# Patient Record
Sex: MALE | Race: WHITE | NOT HISPANIC OR LATINO | Employment: OTHER | URBAN - METROPOLITAN AREA
[De-identification: names, ages, dates, MRNs, and addresses within clinical notes are randomized per-mention and may not be internally consistent; named-entity substitution may affect disease eponyms.]

---

## 2020-02-17 ENCOUNTER — TELEPHONE (OUTPATIENT)
Dept: ORTHOPEDICS | Facility: CLINIC | Age: 45
End: 2020-02-17

## 2020-02-17 DIAGNOSIS — M25.521 RIGHT ELBOW PAIN: Primary | ICD-10-CM

## 2020-02-17 NOTE — TELEPHONE ENCOUNTER
Called patient to schedule referred appointment from [facility] with Dr. Culver for treatment of right elbow pain. Appointment made for 02/19/2020 at 8:00 AM at the [facility] and patient verbalized understanding of appointment date, time, and location.

## 2020-02-19 ENCOUNTER — HOSPITAL ENCOUNTER (OUTPATIENT)
Dept: RADIOLOGY | Facility: HOSPITAL | Age: 45
Discharge: HOME | End: 2020-02-19
Attending: ORTHOPAEDIC SURGERY
Payer: MEDICARE

## 2020-02-19 ENCOUNTER — OFFICE VISIT (OUTPATIENT)
Dept: ORTHOPEDICS | Facility: CLINIC | Age: 45
End: 2020-02-19
Payer: MEDICARE

## 2020-02-19 VITALS
DIASTOLIC BLOOD PRESSURE: 112 MMHG | SYSTOLIC BLOOD PRESSURE: 176 MMHG | WEIGHT: 180 LBS | BODY MASS INDEX: 23.1 KG/M2 | HEART RATE: 113 BPM | HEIGHT: 74 IN

## 2020-02-19 DIAGNOSIS — M70.20 OLECRANON BURSITIS: ICD-10-CM

## 2020-02-19 DIAGNOSIS — M25.521 RIGHT ELBOW PAIN: ICD-10-CM

## 2020-02-19 DIAGNOSIS — Z01.818 PRE-OP TESTING: Primary | ICD-10-CM

## 2020-02-19 DIAGNOSIS — Z98.890 HISTORY OF KNEE SURGERY: ICD-10-CM

## 2020-02-19 DIAGNOSIS — M70.21 OLECRANON BURSITIS, RIGHT ELBOW: ICD-10-CM

## 2020-02-19 PROCEDURE — 99205 PR OFFICE/OUTPT VISIT, NEW, LEVL V, 60-74 MIN: ICD-10-PCS | Mod: 25,S$PBB,, | Performed by: ORTHOPAEDIC SURGERY

## 2020-02-19 PROCEDURE — 73080 X-RAY EXAM OF ELBOW: CPT | Mod: TC,PN,RT

## 2020-02-19 PROCEDURE — 73080 X-RAY EXAM OF ELBOW: CPT | Mod: 26,RT,, | Performed by: RADIOLOGY

## 2020-02-19 PROCEDURE — 99213 OFFICE O/P EST LOW 20 MIN: CPT | Mod: PBBFAC,25,PN | Performed by: ORTHOPAEDIC SURGERY

## 2020-02-19 PROCEDURE — 99999 PR PBB SHADOW E&M-EST. PATIENT-LVL III: ICD-10-PCS | Mod: PBBFAC,,, | Performed by: ORTHOPAEDIC SURGERY

## 2020-02-19 PROCEDURE — 99999 PR PBB SHADOW E&M-EST. PATIENT-LVL III: CPT | Mod: PBBFAC,,, | Performed by: ORTHOPAEDIC SURGERY

## 2020-02-19 PROCEDURE — 73080 XR ELBOW COMPLETE 3 VIEW RIGHT: ICD-10-PCS | Mod: 26,RT,, | Performed by: RADIOLOGY

## 2020-02-19 PROCEDURE — 99205 OFFICE O/P NEW HI 60 MIN: CPT | Mod: 25,S$PBB,, | Performed by: ORTHOPAEDIC SURGERY

## 2020-02-19 RX ORDER — MUPIROCIN 20 MG/G
OINTMENT TOPICAL
Status: CANCELLED | OUTPATIENT
Start: 2020-02-19

## 2020-02-19 RX ORDER — SODIUM CHLORIDE 9 MG/ML
INJECTION, SOLUTION INTRAVENOUS CONTINUOUS
Status: CANCELLED | OUTPATIENT
Start: 2020-02-19

## 2020-02-19 NOTE — PROGRESS NOTES
Subjective:      Patient ID: Ayan Marsh is a 44 y.o. male.    Chief Complaint: Injury of the Right Elbow    Referring Provider: Any Self  No address on file    HPI:  Mr. Marsh is a 44-year-old right-hand-dominant male who presented today for evaluation of approximately 7 years of right elbow pain and swelling which began in 2013 after he fell on his shower.  He had an excision of a bursectomy completed in 2014 and his symptoms resolved until approximately 2 weeks ago after he tripped and fell on concrete banging his elbow again. He has persistent pain and swelling since that time and nothing seems to improve it. He has taken NSAIDs without help.  He has not done physical therapy, worn a brace, nor had injections.    Past Medical History:   Diagnosis Date     *  *  *  *  * Fractures right tibia  Hypertension  Depression  Anxiety  GERD  Osteomyelitis right tibia requiring BKA right leg      Past Surgical History:   Procedure Laterality Date    CERVICAL FUSION      HAND SURGERY tendon reconstruction both hands      LEG AMPUTATION right BKA     * BURSECTOMY RIGHT ELBOW       Review of patient's allergies indicates:  No Known Allergies    Social History     Occupational History    Electric shin   Tobacco Use    Smoking status: Current Every Day Smoker   Substance and Sexual Activity    Alcohol use: Yes     Frequency: Monthly or less    Drug use: Yes     Types: Marijuana    Sexual activity: Heterosexual      Family History   Problem Relation Age of Onset    Hypertension Mother     Hypertension Father        Previous Hospitalizations:  Right below-the-knee amputation.    ROS:   Review of Systems   Constitution: Negative for chills and fever.   HENT: Negative for congestion.    Eyes: Negative for blurred vision.   Cardiovascular: Negative for chest pain.   Respiratory: Negative for cough.    Endocrine: Negative for polydipsia.   Hematologic/Lymphatic: Negative for adenopathy.   Skin: Negative  for flushing, itching and skin cancer.   Musculoskeletal: Negative for gout.   Gastrointestinal: Positive for heartburn. Negative for constipation and diarrhea.   Genitourinary: Negative for nocturia.   Neurological: Negative for headaches and seizures.   Psychiatric/Behavioral: Positive for depression. Negative for substance abuse. The patient is nervous/anxious.    Allergic/Immunologic: Negative for environmental allergies.           Objective:      Physical Exam:   General: AAOx3.  No acute distress  HEENT: Normocephalic, PEARLA EOMI, fair dentition  Neck: Supple, No JVD  Chest: Symetric, equal excursion on inspiration  Abdomen: Soft NTND  Vascular:  Pulses intact and equal bilaterally.  Capillary refill less than 3 seconds and equal bilaterally  Neurologic:  Pinprick and soft touch intact and equal bilaterally  Integment:  No ecchymosis, no errythema.  Abrasion olecranon right elbow.  Extremity:  Elbow:  Pronation/supination equal bilaterally 90/85 degrees. Extension/flexion equal bilaterally 0/128 degrees. Radial/ulna stressing equal bilaterally with endpoint.  Nontender over the radial head.  Nontender over the epicondyles bilaterally. Posterior swelling right elbow.  Tender with palpation posterior swelling right elbow.  Posterior swelling solid.  Nontender at the triceps insertion on the olecranon.  Triceps palpable throughout full distribution bilaterally.  Radiography:  Personally reviewed x-rays of the right elbow completed on 02/09/2020 showed posterior olecranon swelling no fracture dislocation.      Assessment:       Impression:  Symptomatic traumatic olecranon bursitis, right elbow.                              Plan:       1.  Discussed physical examination and radiographic findings with the patient. Ayan understands that he has a traumatic olecranon bursitis of his right elbow and his treatment alternatives could be observation, aspiration, colder warm compresses, padding/bracing, NSAIDs, or  surgical excision.  He stated that since he got a good result with surgical excision in the past he would prefer to have the bursitis removed and proceed with surgery. He understands that since he has an abrasion over his elbow he cannot immediately proceed with surgery and he must wait approximately 1 week.  2.  Possible complications of surgery and alternatives to surgery were discussed with the patient, possible complications include bleeding, infection, scarring, nerve/blood vessel/tendon damage, need for further surgery, failed surgery, failure to improve, possible persistent pain, possible recurrence, possible skin slough, and possible amputation were discussed with the patient.  The patient was permitted to ask questions and all concerns were addressed to his satisfaction.  3.  Consent for olecranon bursectomy, right elbow.  4.  All postoperative meds will be prescribed on the date of surgery.  5.  Recommend the patient purchase elbow pads and wear them.  6.  Home exercises to include avoidance of leaning on elbows was shown discussed with the patient.  7.  Apply heating pad to elbows.  8.  Tentatively schedule surgery for 02/27/2020.  9.  Preop labs were ordered to include CBC, metabolic panel, and PT/INR.  10.  [facility] portal was discussed with the patient and information was given.  The patient was encouraged to use the portal for future encounters.  11.  Follow up approximately 10-12 days postop.

## 2020-02-19 NOTE — H&P
Chief Complaint: Injury of the Right Elbow    HPI:  Mr. Marsh is a 44-year-old right-hand-dominant male who presented today for evaluation of approximately 7 years of right elbow pain and swelling which began in 2013 after he fell on his shower.  He had an excision of a bursectomy completed in 2014 and his symptoms resolved until approximately 2 weeks ago after he tripped and fell on concrete banging his elbow again. He has persistent pain and swelling since that time and nothing seems to improve it. He has taken NSAIDs without help.  He has not done physical therapy, worn a brace, nor had injections.    Past Medical History:   Diagnosis Date     *  *  *  *  * Fractures right tibia  Hypertension  Depression  Anxiety  GERD  Osteomyelitis right tibia requiring BKA right leg      Past Surgical History:   Procedure Laterality Date    CERVICAL FUSION      HAND SURGERY tendon reconstruction both hands      LEG AMPUTATION right BKA     * BURSECTOMY RIGHT ELBOW       Review of patient's allergies indicates:  No Known Allergies    Social History     Occupational History    Electric shin   Tobacco Use    Smoking status: Current Every Day Smoker   Substance and Sexual Activity    Alcohol use: Yes     Frequency: Monthly or less    Drug use: Yes     Types: Marijuana    Sexual activity: Heterosexual      Family History   Problem Relation Age of Onset    Hypertension Mother     Hypertension Father        Previous Hospitalizations:  Right below-the-knee amputation.    ROS:   Review of Systems   Constitution: Negative for chills and fever.   HENT: Negative for congestion.    Eyes: Negative for blurred vision.   Cardiovascular: Negative for chest pain.   Respiratory: Negative for cough.    Endocrine: Negative for polydipsia.   Hematologic/Lymphatic: Negative for adenopathy.   Skin: Negative for flushing, itching and skin cancer.   Musculoskeletal: Negative for gout.   Gastrointestinal: Positive for heartburn. Negative  for constipation and diarrhea.   Genitourinary: Negative for nocturia.   Neurological: Negative for headaches and seizures.   Psychiatric/Behavioral: Positive for depression. Negative for substance abuse. The patient is nervous/anxious.    Allergic/Immunologic: Negative for environmental allergies.           Objective:      Physical Exam:   General: AAOx3.  No acute distress  HEENT: Normocephalic, PEARLA EOMI, fair dentition  Neck: Supple, No JVD  Chest: Symetric, equal excursion on inspiration  Abdomen: Soft NTND  Vascular:  Pulses intact and equal bilaterally.  Capillary refill less than 3 seconds and equal bilaterally  Neurologic:  Pinprick and soft touch intact and equal bilaterally  Integment:  No ecchymosis, no errythema.  Abrasion olecranon right elbow.  Extremity:  Elbow:  Pronation/supination equal bilaterally 90/85 degrees. Extension/flexion equal bilaterally 0/128 degrees. Radial/ulna stressing equal bilaterally with endpoint.  Nontender over the radial head.  Nontender over the epicondyles bilaterally. Posterior swelling right elbow.  Tender with palpation posterior swelling right elbow.  Posterior swelling solid.  Nontender at the triceps insertion on the olecranon.  Triceps palpable throughout full distribution bilaterally.  Radiography:  Personally reviewed x-rays of the right elbow completed on 02/09/2020 showed posterior olecranon swelling no fracture dislocation.      Assessment:       Impression:  Symptomatic traumatic olecranon bursitis, right elbow.                              Plan:       1.  Discussed physical examination and radiographic findings with the patient. Ayan understands that he has a traumatic olecranon bursitis of his right elbow and his treatment alternatives could be observation, aspiration, colder warm compresses, padding/bracing, NSAIDs, or surgical excision.  He stated that since he got a good result with surgical excision in the past he would prefer to have the bursitis  removed and proceed with surgery. He understands that since he has an abrasion over his elbow he cannot immediately proceed with surgery and he must wait approximately 1 week.  2.  Possible complications of surgery and alternatives to surgery were discussed with the patient, possible complications include bleeding, infection, scarring, nerve/blood vessel/tendon damage, need for further surgery, failed surgery, failure to improve, possible persistent pain, possible recurrence, possible skin slough, and possible amputation were discussed with the patient.  The patient was permitted to ask questions and all concerns were addressed to his satisfaction.  3.  Consent for olecranon bursectomy, right elbow.  4.  All postoperative meds will be prescribed on the date of surgery.  5.  Recommend the patient purchase elbow pads and wear them.  6.  Home exercises to include avoidance of leaning on elbows was shown discussed with the patient.  7.  Apply heating pad to elbows.  8.  [facility] portal was discussed with the patient and information was given.  The patient was encouraged to use the portal for future encounters.  9.  Follow up approximately 10-12 days postop.  10.  Tentatively schedule surgery for 02/27/2020.

## 2020-02-26 ENCOUNTER — TELEPHONE (OUTPATIENT)
Dept: ORTHOPEDICS | Facility: CLINIC | Age: 45
End: 2020-02-26

## 2020-02-26 NOTE — TELEPHONE ENCOUNTER
Called patient to ask about his surgical clearance. No answer at patient's phone number [phone] and no voicemail setup.

## 2020-02-26 NOTE — TELEPHONE ENCOUNTER
Awaiting patient's surgical clearance from his primary care provider.     Called patient's phone number [phone]. Phone number does not belong to patient. I was informed by a woman at this number that she knows about the procedure and does not think he will be having it completed. I was given patient's phone number [phone] with no answer and no voicemail setup.

## 2020-03-05 ENCOUNTER — TELEPHONE (OUTPATIENT)
Dept: ORTHOPEDICS | Facility: CLINIC | Age: 45
End: 2020-03-05

## 2020-03-05 NOTE — TELEPHONE ENCOUNTER
----- Message from Sonali Ng sent at 3/5/2020 12:12 PM CST -----  Type: Calling concerning referral sent    Who Called:  Patient  Best Call Back Number: [phone]  Additional Information: [facility] calling concerning referral sent for patient/please call back to advise.

## 2020-03-05 NOTE — TELEPHONE ENCOUNTER
Returned call the [facility] and verified that referral was completed and patient was already seen.

## 2021-01-10 ENCOUNTER — HOSPITAL ENCOUNTER (EMERGENCY)
Facility: HOSPITAL | Age: 46
Discharge: HOME | End: 2021-01-10
Attending: EMERGENCY MEDICINE
Payer: MEDICARE

## 2021-01-10 VITALS
BODY MASS INDEX: 23.11 KG/M2 | RESPIRATION RATE: 18 BRPM | HEART RATE: 88 BPM | OXYGEN SATURATION: 97 % | DIASTOLIC BLOOD PRESSURE: 88 MMHG | TEMPERATURE: 98 F | WEIGHT: 180 LBS | SYSTOLIC BLOOD PRESSURE: 128 MMHG

## 2021-01-10 DIAGNOSIS — F32.A MILD EPISODE OF DEPRESSION: ICD-10-CM

## 2021-01-10 DIAGNOSIS — F15.91 HISTORY OF METHAMPHETAMINE USE: Primary | ICD-10-CM

## 2021-01-10 PROCEDURE — 99283 EMERGENCY DEPT VISIT LOW MDM: CPT

## 2021-01-10 PROCEDURE — 25000003 PHARM REV CODE 250: Performed by: EMERGENCY MEDICINE

## 2021-01-10 RX ORDER — ACETAMINOPHEN 325 MG/1
650 TABLET ORAL
Status: COMPLETED | OUTPATIENT
Start: 2021-01-10 | End: 2021-01-10

## 2021-01-10 RX ORDER — IBUPROFEN 400 MG/1
400 TABLET ORAL
Status: COMPLETED | OUTPATIENT
Start: 2021-01-10 | End: 2021-01-10

## 2021-01-10 RX ADMIN — IBUPROFEN 400 MG: 400 TABLET, FILM COATED ORAL at 10:01

## 2021-01-10 RX ADMIN — ACETAMINOPHEN 650 MG: 325 TABLET ORAL at 10:01

## 2022-06-01 ENCOUNTER — HOSPITAL ENCOUNTER (EMERGENCY)
Facility: HOSPITAL | Age: 47
Discharge: HOME | End: 2022-06-01
Attending: FAMILY MEDICINE
Payer: MEDICARE

## 2022-06-01 VITALS
DIASTOLIC BLOOD PRESSURE: 111 MMHG | BODY MASS INDEX: 25.03 KG/M2 | HEART RATE: 108 BPM | TEMPERATURE: 98 F | RESPIRATION RATE: 20 BRPM | SYSTOLIC BLOOD PRESSURE: 142 MMHG | WEIGHT: 195 LBS | HEIGHT: 74 IN | OXYGEN SATURATION: 97 %

## 2022-06-01 DIAGNOSIS — S01.81XA FACIAL LACERATION, INITIAL ENCOUNTER: Primary | ICD-10-CM
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
## 2022-06-01 PROCEDURE — 12011 RPR F/E/E/N/L/M 2.5 CM/<: CPT

## 2022-06-01 PROCEDURE — 12013 RPR F/E/E/N/L/M 2.6-5.0 CM: CPT

## 2022-06-01 PROCEDURE — 70486 CT MAXILLOFACIAL WITHOUT CONTRAST: ICD-10-PCS | Mod: 26,,, | Performed by: RADIOLOGY

## 2022-06-01 PROCEDURE — 99285 EMERGENCY DEPT VISIT HI MDM: CPT | Mod: 25

## 2022-06-01 PROCEDURE — 40650 RPR LIP FTH VERMILION ONLY: CPT

## 2022-06-01 PROCEDURE — 90714 TD VACC NO PRESV 7 YRS+ IM: CPT | Performed by: FAMILY MEDICINE

## 2022-06-01 PROCEDURE — 70486 CT MAXILLOFACIAL W/O DYE: CPT | Mod: TC

## 2022-06-01 PROCEDURE — 70486 CT MAXILLOFACIAL W/O DYE: CPT | Mod: 26,,, | Performed by: RADIOLOGY

## 2022-06-01 PROCEDURE — 63600175 PHARM REV CODE 636 W HCPCS: Performed by: FAMILY MEDICINE

## 2022-06-01 PROCEDURE — 70450 CT HEAD WITHOUT CONTRAST: ICD-10-PCS | Mod: 26,,, | Performed by: RADIOLOGY

## 2022-06-01 PROCEDURE — 70450 CT HEAD/BRAIN W/O DYE: CPT | Mod: 26,,, | Performed by: RADIOLOGY

## 2022-06-01 PROCEDURE — 70450 CT HEAD/BRAIN W/O DYE: CPT | Mod: TC

## 2022-06-01 PROCEDURE — 90471 IMMUNIZATION ADMIN: CPT | Performed by: FAMILY MEDICINE

## 2022-06-01 PROCEDURE — 25000003 PHARM REV CODE 250: Performed by: FAMILY MEDICINE

## 2022-06-01 RX ORDER — LIDOCAINE HYDROCHLORIDE 10 MG/ML
1 INJECTION INFILTRATION; PERINEURAL
Status: DISCONTINUED | OUTPATIENT
Start: 2022-06-01 | End: 2022-06-01

## 2022-06-01 RX ORDER — KETOROLAC TROMETHAMINE 10 MG/1
10 TABLET, FILM COATED ORAL EVERY 6 HOURS
Qty: 20 TABLET | Refills: 0 | Status: SHIPPED | OUTPATIENT
Start: 2022-06-01 | End: 2022-06-06

## 2022-06-01 RX ORDER — LIDOCAINE HYDROCHLORIDE 10 MG/ML
5 INJECTION, SOLUTION EPIDURAL; INFILTRATION; INTRACAUDAL; PERINEURAL
Status: COMPLETED | OUTPATIENT
Start: 2022-06-01 | End: 2022-06-01

## 2022-06-01 RX ADMIN — LIDOCAINE HYDROCHLORIDE 50 MG: 10 INJECTION, SOLUTION EPIDURAL; INFILTRATION; INTRACAUDAL; PERINEURAL at 04:06

## 2022-06-01 RX ADMIN — TETANUS AND DIPHTHERIA TOXOIDS ADSORBED 0.5 ML: 2; 2 INJECTION INTRAMUSCULAR at 05:06
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
## 2022-06-01 NOTE — DISCHARGE INSTRUCTIONS
Sutures out in 7 days there are 6 sutures on the eyebrow for on the lip 1 of which is absorbable and 3 on the right buccal area

## 2022-09-23 ENCOUNTER — OFFICE VISIT (OUTPATIENT)
Dept: PRIMARY CARE CLINIC | Facility: CLINIC | Age: 47
End: 2022-09-23
Payer: MEDICARE

## 2022-09-23 VITALS
DIASTOLIC BLOOD PRESSURE: 96 MMHG | BODY MASS INDEX: 23.49 KG/M2 | HEART RATE: 80 BPM | TEMPERATURE: 98 F | RESPIRATION RATE: 18 BRPM | HEIGHT: 74 IN | WEIGHT: 183 LBS | SYSTOLIC BLOOD PRESSURE: 140 MMHG | OXYGEN SATURATION: 96 %

## 2022-09-23 DIAGNOSIS — M70.21 OLECRANON BURSITIS OF RIGHT ELBOW: ICD-10-CM

## 2022-09-23 DIAGNOSIS — Z00.00 ENCOUNTER FOR MEDICAL EXAMINATION TO ESTABLISH CARE: Primary | ICD-10-CM

## 2022-09-23 DIAGNOSIS — L03.115 CELLULITIS OF RIGHT LEG WITHOUT FOOT: ICD-10-CM

## 2022-09-23 DIAGNOSIS — Z13.1 ENCOUNTER FOR SCREENING FOR DIABETES MELLITUS: ICD-10-CM

## 2022-09-23 DIAGNOSIS — Z89.511 HX OF BKA, RIGHT: ICD-10-CM

## 2022-09-23 DIAGNOSIS — I10 HYPERTENSION, ESSENTIAL: ICD-10-CM

## 2022-09-23 DIAGNOSIS — F10.29 ALCOHOL DEPENDENCE WITH UNSPECIFIED ALCOHOL-INDUCED DISORDER: ICD-10-CM

## 2022-09-23 DIAGNOSIS — F17.210 TOBACCO DEPENDENCE DUE TO CIGARETTES: ICD-10-CM

## 2022-09-23 LAB
ALBUMIN SERPL BCP-MCNC: 3.6 G/DL (ref 3.5–5.2)
ALP SERPL-CCNC: 81 U/L (ref 55–135)
ALT SERPL W/O P-5'-P-CCNC: 36 U/L (ref 10–44)
ANION GAP SERPL CALC-SCNC: 11 MMOL/L (ref 8–16)
AST SERPL-CCNC: 40 U/L (ref 10–40)
BASOPHILS # BLD AUTO: 0.07 K/UL (ref 0–0.2)
BASOPHILS NFR BLD: 0.8 % (ref 0–1.9)
BILIRUB SERPL-MCNC: 0.5 MG/DL (ref 0.1–1)
BUN SERPL-MCNC: 11 MG/DL (ref 6–20)
CALCIUM SERPL-MCNC: 8.7 MG/DL (ref 8.7–10.5)
CHLORIDE SERPL-SCNC: 105 MMOL/L (ref 95–110)
CHOLEST SERPL-MCNC: 194 MG/DL (ref 120–199)
CHOLEST/HDLC SERPL: 2.6 {RATIO} (ref 2–5)
CO2 SERPL-SCNC: 23 MMOL/L (ref 23–29)
CREAT SERPL-MCNC: 0.8 MG/DL (ref 0.5–1.4)
DIFFERENTIAL METHOD: ABNORMAL
EOSINOPHIL # BLD AUTO: 0.3 K/UL (ref 0–0.5)
EOSINOPHIL NFR BLD: 3.9 % (ref 0–8)
ERYTHROCYTE [DISTWIDTH] IN BLOOD BY AUTOMATED COUNT: 15.4 % (ref 11.5–14.5)
EST. GFR  (NO RACE VARIABLE): >60 ML/MIN/1.73 M^2
ESTIMATED AVG GLUCOSE: 100 MG/DL (ref 68–131)
GLUCOSE SERPL-MCNC: 99 MG/DL (ref 70–110)
HBA1C MFR BLD: 5.1 % (ref 4–5.6)
HCT VFR BLD AUTO: 45.7 % (ref 40–54)
HCV AB SERPL QL IA: NORMAL
HDLC SERPL-MCNC: 75 MG/DL (ref 40–75)
HDLC SERPL: 38.7 % (ref 20–50)
HGB BLD-MCNC: 15.5 G/DL (ref 14–18)
HIV 1+2 AB+HIV1 P24 AG SERPL QL IA: NORMAL
IMM GRANULOCYTES # BLD AUTO: 0.08 K/UL (ref 0–0.04)
IMM GRANULOCYTES NFR BLD AUTO: 0.9 % (ref 0–0.5)
LDLC SERPL CALC-MCNC: 100.2 MG/DL (ref 63–159)
LYMPHOCYTES # BLD AUTO: 2 K/UL (ref 1–4.8)
LYMPHOCYTES NFR BLD: 23.4 % (ref 18–48)
MCH RBC QN AUTO: 30.6 PG (ref 27–31)
MCHC RBC AUTO-ENTMCNC: 33.9 G/DL (ref 32–36)
MCV RBC AUTO: 90 FL (ref 82–98)
MONOCYTES # BLD AUTO: 0.7 K/UL (ref 0.3–1)
MONOCYTES NFR BLD: 8.6 % (ref 4–15)
NEUTROPHILS # BLD AUTO: 5.4 K/UL (ref 1.8–7.7)
NEUTROPHILS NFR BLD: 62.4 % (ref 38–73)
NONHDLC SERPL-MCNC: 119 MG/DL
NRBC BLD-RTO: 0 /100 WBC
PLATELET # BLD AUTO: 199 K/UL (ref 150–450)
PMV BLD AUTO: 11.1 FL (ref 9.2–12.9)
POTASSIUM SERPL-SCNC: 4.3 MMOL/L (ref 3.5–5.1)
PROT SERPL-MCNC: 7 G/DL (ref 6–8.4)
RBC # BLD AUTO: 5.07 M/UL (ref 4.6–6.2)
SODIUM SERPL-SCNC: 139 MMOL/L (ref 136–145)
TRIGL SERPL-MCNC: 94 MG/DL (ref 30–150)
WBC # BLD AUTO: 8.62 K/UL (ref 3.9–12.7)

## 2022-09-23 PROCEDURE — 99205 OFFICE O/P NEW HI 60 MIN: CPT | Mod: S$GLB,,, | Performed by: FAMILY MEDICINE

## 2022-09-23 PROCEDURE — 99205 PR OFFICE/OUTPT VISIT, NEW, LEVL V, 60-74 MIN: ICD-10-PCS | Mod: S$GLB,,, | Performed by: FAMILY MEDICINE

## 2022-09-23 PROCEDURE — 85025 COMPLETE CBC W/AUTO DIFF WBC: CPT | Performed by: FAMILY MEDICINE

## 2022-09-23 PROCEDURE — 83036 HEMOGLOBIN GLYCOSYLATED A1C: CPT | Performed by: FAMILY MEDICINE

## 2022-09-23 PROCEDURE — 87389 HIV-1 AG W/HIV-1&-2 AB AG IA: CPT | Performed by: FAMILY MEDICINE

## 2022-09-23 PROCEDURE — 80061 LIPID PANEL: CPT | Performed by: FAMILY MEDICINE

## 2022-09-23 PROCEDURE — 80053 COMPREHEN METABOLIC PANEL: CPT | Performed by: FAMILY MEDICINE

## 2022-09-23 PROCEDURE — 86803 HEPATITIS C AB TEST: CPT | Performed by: FAMILY MEDICINE

## 2022-09-23 RX ORDER — CEPHALEXIN 500 MG/1
500 CAPSULE ORAL EVERY 12 HOURS
Qty: 14 CAPSULE | Refills: 0 | Status: SHIPPED | OUTPATIENT
Start: 2022-09-23 | End: 2022-09-30
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
## 2022-09-23 NOTE — ASSESSMENT & PLAN NOTE
- discussed extensively smoking cessation today, patient states he is not really interested in smoking cessation however,  is agreeable to be referred to smoking cessation program today

## 2022-09-23 NOTE — PROGRESS NOTES
Subjective:       Patient ID: Ayan Marsh is a 47 y.o. male.    Chief Complaint: Diabetes (New Patient / need a PCP )    47-year-old male presents to clinic to establish care.  States he has a past medical history of depression and hypertension for which he no longer takes medication.  Patient states he had a right below-knee amputation approximately March of 2011 after breaking his leg and it becoming infected.  Patient also states he underwent ACDF at [facility] in 1999.  States he also had surgery on his left arm and his right arm twice due to broken glass.  Patient currently uses [facility] for his prosthesis.  Patient states he has a family history of hyperlipidemia, hypertension, lung cancer, diabetes.  The patient states he smokes approximately 1 pack per day and has been doing so since age of 17 years old, states he also drinks a 6 pack of beer a day and has been doing so for 16 years, states he occasionally smokes marijuana but denies any other illicit drug use, currently works as .    Today, patient is wanting to establish care.  Complains of and nodule on his right elbow, states he would like to have a referral for this area.  States that he previously had surgery on the area due to it being filled with fluid, states there is no fluid in it today.  Patient is also inquiring about a prescription for gabapentin and pain medication.  Patient also states that he was recently seen for disability physical exam, and the physician there was worried due to redness at the site of his BKA.  Discussed with patient I do not prescribe any controlled substances.  States he has been taking gabapentin from his mother.        Discussed care gaps with patient today I will send Gwyn to his home, states he received COVID Peña and Peña vaccine at [facility].  Declined all other vaccinations today.        Current Outpatient Medications:     cephALEXin (KEFLEX) 500 MG capsule, Take 1 capsule (500  "mg total) by mouth every 12 (twelve) hours. Take with food for 7 days, Disp: 14 capsule, Rfl: 0    Review of patient's allergies indicates:   Allergen Reactions    Opioids - morphine analogues        Past Medical History:   Diagnosis Date    Fractures     Hypertension        Past Surgical History:   Procedure Laterality Date    CERVICAL FUSION      HAND SURGERY      LEG AMPUTATION      Right    LEG SURGERY      NECK SURGERY         Family History   Problem Relation Age of Onset    Hypertension Mother     Hypertension Father        Social History     Tobacco Use    Smoking status: Every Day     Packs/day: 1.00     Types: Cigarettes     Passive exposure: Current    Smokeless tobacco: Never   Substance Use Topics    Alcohol use: Yes     Alcohol/week: 7.0 standard drinks     Types: 7 Cans of beer per week    Drug use: Yes     Types: Marijuana, Methamphetamines     Comment: last use 7th of january       Review of Systems   Constitutional:  Negative for activity change, appetite change, fatigue, fever and unexpected weight change.   HENT:  Negative for ear discharge, ear pain, hearing loss and tinnitus.    Eyes:  Negative for photophobia, pain and visual disturbance.   Respiratory:  Negative for cough, shortness of breath and wheezing.    Cardiovascular:  Negative for chest pain and palpitations.   Gastrointestinal:  Negative for abdominal pain, blood in stool, constipation, diarrhea, nausea and vomiting.   Genitourinary:  Negative for dysuria and hematuria.   Musculoskeletal:         Nodule on right elbow, and redness at site of BKA   Neurological:  Negative for weakness and headaches.       Current Medications:   Home Psychiatric Meds:   Psychotherapeutics (From admission, onward)      None                Objective:      Vitals:    09/23/22 1123 09/23/22 1208   BP: (!) 142/86 (!) 140/96   Pulse: 80    Resp: 18    Temp: 98.1 °F (36.7 °C)    TempSrc: Oral    SpO2: 96%    Weight: 83 kg (183 lb)    Height: 6' 2" (1.88 m)  "     Physical Exam  Vitals reviewed.   Constitutional:       Appearance: Normal appearance.   HENT:      Head: Normocephalic.      Right Ear: Tympanic membrane, ear canal and external ear normal.      Left Ear: Tympanic membrane, ear canal and external ear normal.      Nose: Nose normal.      Mouth/Throat:      Mouth: Mucous membranes are moist.   Eyes:      Pupils: Pupils are equal, round, and reactive to light.   Cardiovascular:      Rate and Rhythm: Normal rate and regular rhythm.      Pulses: Normal pulses.      Heart sounds: Normal heart sounds.   Pulmonary:      Effort: Pulmonary effort is normal.      Breath sounds: Normal breath sounds.   Abdominal:      General: Bowel sounds are normal.      Palpations: Abdomen is soft.   Musculoskeletal:      Cervical back: Normal range of motion and neck supple.      Right lower leg: Deformity present. No swelling or bony tenderness.        Legs:       Comments: Erythema noted at the site of BKA along with small nodule no tenderness to palpation      Right Lower Extremity: Right leg is amputated below knee.   Skin:     General: Skin is warm and dry.   Neurological:      General: No focal deficit present.      Mental Status: He is alert and oriented to person, place, and time.   Psychiatric:         Mood and Affect: Mood normal.         Behavior: Behavior normal.         Lab Results   Component Value Date    WBC 8.8 07/08/2022    HGB 14.5 07/08/2022    HCT 44.4 07/08/2022     (L) 07/08/2022     (H) 07/08/2022     (H) 07/08/2022     07/08/2022    K 3.1 (L) 07/08/2022     07/08/2022    CREATININE 0.72 07/08/2022    BUN 4 (L) 07/08/2022    CO2 31 07/08/2022    INR 1.00 07/08/2022      Assessment:       1. Encounter for medical examination to establish care    2. Olecranon bursitis of right elbow    3. Hypertension, essential    4. Encounter for screening for diabetes mellitus    5. Alcohol dependence with unspecified alcohol-induced disorder     6. Cellulitis of right leg without foot    7. Hx of BKA, right    8. Tobacco dependence due to cigarettes          Plan:         Problem List Items Addressed This Visit          Psychiatric    Alcohol dependence with unspecified alcohol-induced disorder     - and discussed with patient to bring his alcohol intake to aid in his overall health and well being, patient does not seem interested in cessation at this time  - encouraged patient to monitor himself for symptoms if he does stop drinking, patient states he has never had DTs in the past when he was in intermediate            Cardiac/Vascular    Hypertension, essential     - return for blood pressure check in 2 weeks  - advised patient to keep blood pressure log at home and bring to next appointment  - consider implementation of medication at this time the 14, patient was previously on lisinopril  - advised patient to taper his alcohol intake as this is also contributing to his elevated blood pressure readings         Relevant Orders    CBC Auto Differential    Comprehensive Metabolic Panel    Lipid Panel       ID    Cellulitis of right leg without foot     - will treat with antibiotics today, patient to notify our office if symptoms worsen  - will re-evaluate when patient returns for blood pressure check         Relevant Medications    cephALEXin (KEFLEX) 500 MG capsule    Other Relevant Orders    Hemoglobin A1C       Endocrine    Encounter for screening for diabetes mellitus    Relevant Orders    Hemoglobin A1C       Orthopedic    Olecranon bursitis of right elbow     - will refer patient to Orthopedics today for further evaluation and treatment         Relevant Orders    Ambulatory referral/consult to Orthopedics    Hx of BKA, right       Other    Encounter for medical examination to establish care - Primary     - will notify patient of lab results by a portal, patient should follow up in 1 month to discuss results         Relevant Orders    CBC Auto Differential     Comprehensive Metabolic Panel    Lipid Panel    HIV 1/2 Ag/Ab (4th Gen)    Hepatitis C Antibody    Cologuard Screening (Multitarget Stool DNA)    Tobacco dependence due to cigarettes     - discussed extensively smoking cessation today, patient states he is not really interested in smoking cessation however,  is agreeable to be referred to smoking cessation program today         Relevant Orders    Ambulatory referral/consult to Smoking Cessation Program         Follow up in about 1 month (around 10/23/2022), or if symptoms worsen or fail to improve.  Will notify patient of lab results via portal.      Approximately 60 minutes were spent on this encounter. This includes face to face time and non-face to face time preparing to see the patient (eg, review of tests), obtaining and/or reviewing separately obtained history, documenting clinical information in the electronic or other health record, independently interpreting results and communicating results to the patient/family/caregiver, or care coordinator.    Instructed patient that if symptoms fail to improve or worsen patient should seek immediate medical attention or report to the nearest emergency department. Patient expressed verbal agreement and understanding to this plan of care.     ANDRY Stringer MD

## 2022-09-23 NOTE — ASSESSMENT & PLAN NOTE
- will notify patient of lab results by a portal, patient should follow up in 1 month to discuss results

## 2022-09-23 NOTE — ASSESSMENT & PLAN NOTE
- return for blood pressure check in 2 weeks  - advised patient to keep blood pressure log at home and bring to next appointment  - consider implementation of medication at this time the 14, patient was previously on lisinopril  - advised patient to taper his alcohol intake as this is also contributing to his elevated blood pressure readings

## 2022-09-23 NOTE — ASSESSMENT & PLAN NOTE
- will treat with antibiotics today, patient to notify our office if symptoms worsen  - will re-evaluate when patient returns for blood pressure check

## 2022-09-30 ENCOUNTER — TELEPHONE (OUTPATIENT)
Dept: FAMILY MEDICINE | Facility: CLINIC | Age: 47
End: 2022-09-30
Payer: MEDICARE

## 2022-09-30 NOTE — TELEPHONE ENCOUNTER
Call placed to [facility] due to message left. Spoke with representative states the wrong ICD code was put in for schedule for the Colorguard testing. Requesting a code for colon screening.  ----- Message from Su Palacios sent at 9/30/2022  9:04 AM CDT -----  Contact: Bridget  Type: Needs Medical Advice    Who Called: [email or web address]  Best Call Back Number: [phone]  Additional  Information: calling to let office know the cologuard kit has not been yet shipped to pt. Due to ICD 10 code is not correct   Please Advise- Thank you

## 2022-10-03 DIAGNOSIS — Z12.11 SCREENING FOR COLON CANCER: Primary | ICD-10-CM

## 2022-10-03 NOTE — TELEPHONE ENCOUNTER
Second call place to [employer] spoke with Hal given the correct ICD code and will be able to send the kit and will send a confirmation # to Dr. Kothari for shipping the kit. Correct code Z12.11

## 2022-12-26 PROBLEM — Z13.1 ENCOUNTER FOR SCREENING FOR DIABETES MELLITUS: Status: RESOLVED | Noted: 2022-09-23 | Resolved: 2022-12-26

## 2022-12-26 PROBLEM — Z00.00 ENCOUNTER FOR MEDICAL EXAMINATION TO ESTABLISH CARE: Status: RESOLVED | Noted: 2022-09-23 | Resolved: 2022-12-26

## 2023-02-08 ENCOUNTER — TELEPHONE (OUTPATIENT)
Dept: PRIMARY CARE CLINIC | Facility: CLINIC | Age: 48
End: 2023-02-08
Payer: MEDICARE

## 2023-02-08 NOTE — TELEPHONE ENCOUNTER
----- Message from Olga Jama sent at 2/7/2023 12:19 PM CST -----  Contact: called at [phone]  Type: Needs Medical Advice  Who Called:  Pt  Best Call Back Number: [phone]  Additional Information: Pt is calling to see if he can be seen on 02/10/2023. Pt already has an appt on 02/09/2023 but will be in the area on 02/10/2023 for another appt. Please call back and advise.

## 2023-02-09 ENCOUNTER — OFFICE VISIT (OUTPATIENT)
Dept: PRIMARY CARE CLINIC | Facility: CLINIC | Age: 48
End: 2023-02-09
Payer: MEDICARE

## 2023-02-09 VITALS
DIASTOLIC BLOOD PRESSURE: 88 MMHG | OXYGEN SATURATION: 96 % | HEART RATE: 86 BPM | TEMPERATURE: 98 F | BODY MASS INDEX: 24 KG/M2 | WEIGHT: 187 LBS | SYSTOLIC BLOOD PRESSURE: 138 MMHG | RESPIRATION RATE: 18 BRPM | HEIGHT: 74 IN

## 2023-02-09 DIAGNOSIS — E78.2 MIXED HYPERLIPIDEMIA: ICD-10-CM

## 2023-02-09 DIAGNOSIS — Z11.59 ENCOUNTER FOR HEPATITIS C SCREENING TEST FOR LOW RISK PATIENT: ICD-10-CM

## 2023-02-09 DIAGNOSIS — R03.0 TRANSIENT HYPERTENSION: Primary | ICD-10-CM

## 2023-02-09 DIAGNOSIS — N40.0 BENIGN PROSTATIC HYPERPLASIA, UNSPECIFIED WHETHER LOWER URINARY TRACT SYMPTOMS PRESENT: ICD-10-CM

## 2023-02-09 DIAGNOSIS — Z89.511 HX OF RIGHT BKA: ICD-10-CM

## 2023-02-09 PROCEDURE — 99214 OFFICE O/P EST MOD 30 MIN: CPT | Mod: S$GLB,,, | Performed by: INTERNAL MEDICINE

## 2023-02-09 PROCEDURE — 99214 PR OFFICE/OUTPT VISIT, EST, LEVL IV, 30-39 MIN: ICD-10-PCS | Mod: S$GLB,,, | Performed by: INTERNAL MEDICINE

## 2023-02-09 RX ORDER — GABAPENTIN 300 MG/1
300 CAPSULE ORAL 2 TIMES DAILY
Qty: 180 CAPSULE | Refills: 3 | Status: SHIPPED | OUTPATIENT
Start: 2023-02-09 | End: 2023-07-13 | Stop reason: DRUGHIGH

## 2023-02-09 RX ORDER — GABAPENTIN 300 MG/1
300 CAPSULE ORAL 3 TIMES DAILY
Qty: 90 CAPSULE | Refills: 11 | Status: SHIPPED | OUTPATIENT
Start: 2023-02-09 | End: 2023-02-09

## 2023-02-09 NOTE — ASSESSMENT & PLAN NOTE
I am going to fill out the necessary paperwork for Mr. Erwin to get his new right limb prosthesis as promptly as possible  He will need a new socket the balloon is leave a socket insert vac pump aligned level system with ultralight material and a new foot with flexible in her socket and suction socket  I am also going to recommend a multi axial ankle dorsiflexed

## 2023-02-09 NOTE — PROGRESS NOTES
Subjective:       Patient ID: Ayan Marsh is a 47 y.o. male.    Chief Complaint: Follow-up (Re R BKA), Medication Refill (Wants to discuss meds for nerve pain like neurontin), Numbness (He wants to get back on gabapentin), and Prosthetic Joint Infection (More like discomfort of the R stump sec to ill fitting prosthesis)      Patient is established already .......... presents today for a f/u visit , to tingling in R stump and need for a new R leg prosthesis        Medication Refill  Associated symptoms include numbness. Pertinent negatives include no fever.   Leg Pain   The incident occurred more than 1 week ago. Injury mechanism: s/p R BKA. Pain location: R stump. The quality of the pain is described as aching. The pain is at a severity of 5/10. The pain is moderate. The pain has been Fluctuating since onset. Associated symptoms include an inability to bear weight, a loss of motion, numbness and tingling. Foreign body present: just the old prosthesis. The symptoms are aggravated by movement and weight bearing. Treatments tried: gabapentin. The treatment provided moderate relief.   Review of Systems   Constitutional:  Negative for activity change, appetite change and fever.   Respiratory: Negative.     Cardiovascular: Negative.    Gastrointestinal: Negative.    Musculoskeletal: Negative.  Positive for leg pain.        Tingling of R stump   Neurological:  Positive for tingling and numbness.        Tingling of R leg stump       Objective:      Physical Exam  Constitutional:       Appearance: Normal appearance. He is obese.   Cardiovascular:      Rate and Rhythm: Normal rate and regular rhythm.      Pulses: Normal pulses.      Heart sounds: Normal heart sounds. No murmur heard.    No friction rub. No gallop.   Pulmonary:      Effort: Pulmonary effort is normal.      Breath sounds: Normal breath sounds. No wheezing.   Abdominal:      General: Abdomen is flat. Bowel sounds are normal.      Palpations: Abdomen is  soft.   Musculoskeletal:         General: Normal range of motion.      Comments: S/p R BKA with erythema of ant aspect of stump   Skin:     General: Skin is warm and dry.   Neurological:      General: No focal deficit present.      Mental Status: He is alert and oriented to person, place, and time.   Psychiatric:         Mood and Affect: Mood normal.       Assessment:       1. Transient hypertension  -     Comprehensive Metabolic Panel; Future; Expected date: 02/09/2023  -     Microalbumin/Creatinine Ratio, Urine; Future; Expected date: 02/09/2023    2. Mixed hyperlipidemia  -     Lipid Panel; Future; Expected date: 02/09/2023    3. Benign prostatic hyperplasia, unspecified whether lower urinary tract symptoms present  -     Prostate Specific Antigen, Diagnostic; Future; Expected date: 02/09/2023    4. Encounter for hepatitis C screening test for low risk patient  -     Hepatitis C Antibody; Future; Expected date: 02/09/2023    5. Hx of right BKA  Overview:  Patient has no comorbidities that will keep him from using the prosthesis  Patient uses the prosthesis daily  Patient is a K3 functional level  Current prosthesis is unsafe due to cracks in the socket and carbon fiber foot is splintering.  Mr. Marsh is a very active individual who must work on his knees, squat, carry items when walking and walks on uneven terrain regularly.  He cares for himself and performs household chores and grocery shopping.  In you prosthesis we will allow him to perform his normal activities safely and securely    Assessment & Plan:  I am going to fill out the necessary paperwork for Mr. Erwin to get his new right limb prosthesis as promptly as possible  He will need a new socket the balloon is leave a socket insert vac pump aligned level system with ultralight material and a new foot with flexible in her socket and suction socket  I am also going to recommend a multi axial ankle dorsiflexed      Other orders  -     Discontinue:  gabapentin (NEURONTIN) 300 MG capsule; Take 1 capsule (300 mg total) by mouth 3 (three) times daily.  Dispense: 90 capsule; Refill: 11  -     gabapentin (NEURONTIN) 300 MG capsule; Take 1 capsule (300 mg total) by mouth 2 (two) times daily.  Dispense: 180 capsule; Refill: 3             Plan:       1. Transient hypertension  -     Comprehensive Metabolic Panel; Future; Expected date: 02/09/2023  -     Microalbumin/Creatinine Ratio, Urine; Future; Expected date: 02/09/2023    2. Mixed hyperlipidemia  -     Lipid Panel; Future; Expected date: 02/09/2023    3. Benign prostatic hyperplasia, unspecified whether lower urinary tract symptoms present  -     Prostate Specific Antigen, Diagnostic; Future; Expected date: 02/09/2023    4. Encounter for hepatitis C screening test for low risk patient  -     Hepatitis C Antibody; Future; Expected date: 02/09/2023    5. Hx of right BKA  Overview:  Patient has no comorbidities that will keep him from using the prosthesis  Patient uses the prosthesis daily  Patient is a K3 functional level  Current prosthesis is unsafe due to cracks in the socket and carbon fiber foot is splintering.  Mr. Marsh is a very active individual who must work on his knees, squat, carry items when walking and walks on uneven terrain regularly.  He cares for himself and performs household chores and grocery shopping.  In you prosthesis we will allow him to perform his normal activities safely and securely    Assessment & Plan:  I am going to fill out the necessary paperwork for Mr. Erwin to get his new right limb prosthesis as promptly as possible  He will need a new socket the balloon is leave a socket insert vac pump aligned level system with ultralight material and a new foot with flexible in her socket and suction socket  I am also going to recommend a multi axial ankle dorsiflexed      Other orders  -     Discontinue: gabapentin (NEURONTIN) 300 MG capsule; Take 1 capsule (300 mg total) by mouth 3  (three) times daily.  Dispense: 90 capsule; Refill: 11  -     gabapentin (NEURONTIN) 300 MG capsule; Take 1 capsule (300 mg total) by mouth 2 (two) times daily.  Dispense: 180 capsule; Refill: 3

## 2023-02-20 ENCOUNTER — LAB VISIT (OUTPATIENT)
Dept: LAB | Facility: HOSPITAL | Age: 48
End: 2023-02-20
Attending: INTERNAL MEDICINE
Payer: MEDICARE

## 2023-02-20 DIAGNOSIS — Z11.59 ENCOUNTER FOR HEPATITIS C SCREENING TEST FOR LOW RISK PATIENT: ICD-10-CM

## 2023-02-20 DIAGNOSIS — R03.0 TRANSIENT HYPERTENSION: ICD-10-CM

## 2023-02-20 DIAGNOSIS — E78.2 MIXED HYPERLIPIDEMIA: ICD-10-CM

## 2023-02-20 DIAGNOSIS — N40.0 BENIGN PROSTATIC HYPERPLASIA, UNSPECIFIED WHETHER LOWER URINARY TRACT SYMPTOMS PRESENT: ICD-10-CM

## 2023-02-20 LAB
ALBUMIN SERPL BCP-MCNC: 3.8 G/DL (ref 3.5–5.2)
ALP SERPL-CCNC: 101 U/L (ref 55–135)
ALT SERPL W/O P-5'-P-CCNC: 79 U/L (ref 10–44)
ANION GAP SERPL CALC-SCNC: 14 MMOL/L (ref 8–16)
AST SERPL-CCNC: 105 U/L (ref 10–40)
BILIRUB SERPL-MCNC: 0.7 MG/DL (ref 0.1–1)
BUN SERPL-MCNC: 5 MG/DL (ref 6–20)
CALCIUM SERPL-MCNC: 9.1 MG/DL (ref 8.7–10.5)
CHLORIDE SERPL-SCNC: 104 MMOL/L (ref 95–110)
CHOLEST SERPL-MCNC: 171 MG/DL (ref 120–199)
CHOLEST/HDLC SERPL: 2.2 {RATIO} (ref 2–5)
CO2 SERPL-SCNC: 22 MMOL/L (ref 23–29)
COMPLEXED PSA SERPL-MCNC: 1.6 NG/ML (ref 0–4)
CREAT SERPL-MCNC: 1 MG/DL (ref 0.5–1.4)
EST. GFR  (NO RACE VARIABLE): >60 ML/MIN/1.73 M^2
GLUCOSE SERPL-MCNC: 155 MG/DL (ref 70–110)
HCV AB SERPL QL IA: NORMAL
HDLC SERPL-MCNC: 77 MG/DL (ref 40–75)
HDLC SERPL: 45 % (ref 20–50)
LDLC SERPL CALC-MCNC: 67.2 MG/DL (ref 63–159)
NONHDLC SERPL-MCNC: 94 MG/DL
POTASSIUM SERPL-SCNC: 3.7 MMOL/L (ref 3.5–5.1)
PROT SERPL-MCNC: 7 G/DL (ref 6–8.4)
SODIUM SERPL-SCNC: 140 MMOL/L (ref 136–145)
TRIGL SERPL-MCNC: 134 MG/DL (ref 30–150)

## 2023-02-20 PROCEDURE — 80061 LIPID PANEL: CPT | Performed by: INTERNAL MEDICINE

## 2023-02-20 PROCEDURE — 80053 COMPREHEN METABOLIC PANEL: CPT | Performed by: INTERNAL MEDICINE

## 2023-02-20 PROCEDURE — 84153 ASSAY OF PSA TOTAL: CPT | Performed by: INTERNAL MEDICINE

## 2023-02-20 PROCEDURE — 36415 COLL VENOUS BLD VENIPUNCTURE: CPT | Performed by: INTERNAL MEDICINE

## 2023-02-20 PROCEDURE — 86803 HEPATITIS C AB TEST: CPT | Performed by: INTERNAL MEDICINE

## 2023-04-11 ENCOUNTER — PATIENT MESSAGE (OUTPATIENT)
Dept: ADMINISTRATIVE | Facility: HOSPITAL | Age: 48
End: 2023-04-11
Payer: MEDICARE

## 2023-07-13 ENCOUNTER — OFFICE VISIT (OUTPATIENT)
Dept: PRIMARY CARE CLINIC | Facility: CLINIC | Age: 48
End: 2023-07-13
Payer: MEDICARE

## 2023-07-13 VITALS
SYSTOLIC BLOOD PRESSURE: 142 MMHG | HEIGHT: 74 IN | OXYGEN SATURATION: 96 % | HEART RATE: 84 BPM | BODY MASS INDEX: 22.59 KG/M2 | WEIGHT: 176 LBS | DIASTOLIC BLOOD PRESSURE: 98 MMHG

## 2023-07-13 DIAGNOSIS — I10 HYPERTENSION, ESSENTIAL: ICD-10-CM

## 2023-07-13 DIAGNOSIS — R73.9 ELEVATED BLOOD SUGAR: ICD-10-CM

## 2023-07-13 DIAGNOSIS — Z12.12 SCREENING FOR MALIGNANT NEOPLASM OF THE RECTUM: ICD-10-CM

## 2023-07-13 DIAGNOSIS — I10 ESSENTIAL HYPERTENSION, BENIGN: Primary | ICD-10-CM

## 2023-07-13 DIAGNOSIS — E78.2 MIXED HYPERLIPIDEMIA: ICD-10-CM

## 2023-07-13 DIAGNOSIS — R53.82 CHRONIC FATIGUE: ICD-10-CM

## 2023-07-13 DIAGNOSIS — G54.6 PHANTOM PAIN AFTER AMPUTATION OF LOWER EXTREMITY: ICD-10-CM

## 2023-07-13 DIAGNOSIS — N40.0 BENIGN PROSTATIC HYPERPLASIA WITHOUT LOWER URINARY TRACT SYMPTOMS: ICD-10-CM

## 2023-07-13 DIAGNOSIS — T84.019D: ICD-10-CM

## 2023-07-13 DIAGNOSIS — F10.29 ALCOHOL DEPENDENCE WITH UNSPECIFIED ALCOHOL-INDUCED DISORDER: ICD-10-CM

## 2023-07-13 PROBLEM — T84.019A: Status: ACTIVE | Noted: 2023-07-13

## 2023-07-13 PROCEDURE — 99214 PR OFFICE/OUTPT VISIT, EST, LEVL IV, 30-39 MIN: ICD-10-PCS | Mod: S$GLB,,, | Performed by: INTERNAL MEDICINE

## 2023-07-13 PROCEDURE — 99214 OFFICE O/P EST MOD 30 MIN: CPT | Mod: S$GLB,,, | Performed by: INTERNAL MEDICINE

## 2023-07-13 RX ORDER — GABAPENTIN 600 MG/1
600 TABLET ORAL 3 TIMES DAILY
Qty: 90 TABLET | Refills: 2 | Status: SHIPPED | OUTPATIENT
Start: 2023-07-13 | End: 2023-10-11

## 2023-07-13 NOTE — PROGRESS NOTES
Subjective:       Patient ID: Ayan Marsh is a 47 y.o. male.    Chief Complaint: Follow-up      Patient is established already .......... presents today for a f/u visit , to discuss labs and for management of the chronic conditions antione illfitting R leg prosthesis, BP, neuropathy      Follow-up  Pertinent negatives include no fever.   Hypertension  This is a chronic problem. The current episode started more than 1 year ago. The problem has been waxing and waning since onset. The problem is uncontrolled. Associated symptoms include anxiety. Risk factors for coronary artery disease include family history, dyslipidemia, male gender and stress. Past treatments include nothing. Compliance problems include diet, exercise and psychosocial issues.  Hypertensive end-organ damage includes CAD/MI.   Review of Systems   Constitutional:  Negative for activity change, appetite change and fever.   Respiratory: Negative.     Cardiovascular: Negative.    Gastrointestinal: Negative.    Musculoskeletal: Negative.  Positive for leg pain.       Objective:      Physical Exam  Vitals and nursing note reviewed.   Constitutional:       Appearance: Normal appearance. He is obese.   Cardiovascular:      Rate and Rhythm: Normal rate and regular rhythm.      Pulses: Normal pulses.      Heart sounds: Normal heart sounds. No murmur heard.    No friction rub. No gallop.   Pulmonary:      Effort: Pulmonary effort is normal.      Breath sounds: Normal breath sounds. No wheezing.   Musculoskeletal:      Comments: S/p R BKA   Skin:     General: Skin is warm and dry.   Neurological:      Mental Status: He is alert.   Psychiatric:         Mood and Affect: Mood normal.       Assessment:       1. Essential hypertension, benign  -     Comprehensive Metabolic Panel; Future; Expected date: 07/13/2023    2. Alcohol dependence with unspecified alcohol-induced disorder  Overview:  Patient is slowing down    Assessment & Plan:  Recheck LFTs  D/w pt      3.  Benign prostatic hyperplasia without lower urinary tract symptoms  -     Prostate Specific Antigen, Diagnostic; Future; Expected date: 07/13/2023    4. Mixed hyperlipidemia  -     Lipid Panel; Future; Expected date: 07/13/2023    5. Elevated blood sugar  -     Hemoglobin A1C; Future; Expected date: 07/13/2023    6. Screening for malignant neoplasm of the rectum  -     Cologuard Screening (Multitarget Stool DNA); Future; Expected date: 07/13/2023    7. Chronic fatigue  -     TSH; Future; Expected date: 07/13/2023    8. Hypertension, essential  Overview:  Elevated today    Assessment & Plan:  Check TSH,lpiids & renal fx      9. Breakage of joint prosthesis, subsequent encounter  Overview:  Of R BKA prosthesis    Assessment & Plan:  Order new supplies next      10. Phantom pain after amputation of lower extremity  Overview:  On R side    Assessment & Plan:  Inc gabapentin to 600mg tid prn  Order new prosthetic supplies      Other orders  -     gabapentin (NEURONTIN) 600 MG tablet; Take 1 tablet (600 mg total) by mouth 3 (three) times daily.  Dispense: 90 tablet; Refill: 2             Plan:       1. Essential hypertension, benign  -     Comprehensive Metabolic Panel; Future; Expected date: 07/13/2023    2. Alcohol dependence with unspecified alcohol-induced disorder  Overview:  Patient is slowing down    Assessment & Plan:  Recheck LFTs  D/w pt      3. Benign prostatic hyperplasia without lower urinary tract symptoms  -     Prostate Specific Antigen, Diagnostic; Future; Expected date: 07/13/2023    4. Mixed hyperlipidemia  -     Lipid Panel; Future; Expected date: 07/13/2023    5. Elevated blood sugar  -     Hemoglobin A1C; Future; Expected date: 07/13/2023    6. Screening for malignant neoplasm of the rectum  -     Cologuard Screening (Multitarget Stool DNA); Future; Expected date: 07/13/2023    7. Chronic fatigue  -     TSH; Future; Expected date: 07/13/2023    8. Hypertension, essential  Overview:  Elevated  today    Assessment & Plan:  Check TSH,lpiids & renal fx      9. Breakage of joint prosthesis, subsequent encounter  Overview:  Of R BKA prosthesis    Assessment & Plan:  Order new supplies next      10. Phantom pain after amputation of lower extremity  Overview:  On R side    Assessment & Plan:  Inc gabapentin to 600mg tid prn  Order new prosthetic supplies      Other orders  -     gabapentin (NEURONTIN) 600 MG tablet; Take 1 tablet (600 mg total) by mouth 3 (three) times daily.  Dispense: 90 tablet; Refill: 2

## 2023-08-08 ENCOUNTER — LAB VISIT (OUTPATIENT)
Dept: LAB | Facility: HOSPITAL | Age: 48
End: 2023-08-08
Attending: INTERNAL MEDICINE
Payer: MEDICARE

## 2023-08-08 DIAGNOSIS — E78.2 MIXED HYPERLIPIDEMIA: ICD-10-CM

## 2023-08-08 DIAGNOSIS — I10 ESSENTIAL HYPERTENSION, BENIGN: ICD-10-CM

## 2023-08-08 DIAGNOSIS — N40.0 BENIGN PROSTATIC HYPERPLASIA WITHOUT LOWER URINARY TRACT SYMPTOMS: ICD-10-CM

## 2023-08-08 DIAGNOSIS — R73.9 ELEVATED BLOOD SUGAR: ICD-10-CM

## 2023-08-08 DIAGNOSIS — R53.82 CHRONIC FATIGUE: ICD-10-CM

## 2023-08-08 LAB
ALBUMIN SERPL BCP-MCNC: 3.8 G/DL (ref 3.5–5.2)
ALP SERPL-CCNC: 103 U/L (ref 55–135)
ALT SERPL W/O P-5'-P-CCNC: 47 U/L (ref 10–44)
ANION GAP SERPL CALC-SCNC: 9 MMOL/L (ref 8–16)
AST SERPL-CCNC: 79 U/L (ref 10–40)
BILIRUB SERPL-MCNC: 0.6 MG/DL (ref 0.1–1)
BUN SERPL-MCNC: 8 MG/DL (ref 6–20)
CALCIUM SERPL-MCNC: 9.2 MG/DL (ref 8.7–10.5)
CHLORIDE SERPL-SCNC: 105 MMOL/L (ref 95–110)
CHOLEST SERPL-MCNC: 173 MG/DL (ref 120–199)
CHOLEST/HDLC SERPL: 3.8 {RATIO} (ref 2–5)
CO2 SERPL-SCNC: 25 MMOL/L (ref 23–29)
COMPLEXED PSA SERPL-MCNC: 1 NG/ML (ref 0–4)
CREAT SERPL-MCNC: 1.1 MG/DL (ref 0.5–1.4)
EST. GFR  (NO RACE VARIABLE): >60 ML/MIN/1.73 M^2
ESTIMATED AVG GLUCOSE: 85 MG/DL (ref 68–131)
GLUCOSE SERPL-MCNC: 106 MG/DL (ref 70–110)
HBA1C MFR BLD: 4.6 % (ref 4–5.6)
HDLC SERPL-MCNC: 46 MG/DL (ref 40–75)
HDLC SERPL: 26.6 % (ref 20–50)
LDLC SERPL CALC-MCNC: 95.6 MG/DL (ref 63–159)
NONHDLC SERPL-MCNC: 127 MG/DL
POTASSIUM SERPL-SCNC: 3.8 MMOL/L (ref 3.5–5.1)
PROT SERPL-MCNC: 7.2 G/DL (ref 6–8.4)
SODIUM SERPL-SCNC: 139 MMOL/L (ref 136–145)
TRIGL SERPL-MCNC: 157 MG/DL (ref 30–150)
TSH SERPL DL<=0.005 MIU/L-ACNC: 0.46 UIU/ML (ref 0.4–4)

## 2023-08-08 PROCEDURE — 36415 COLL VENOUS BLD VENIPUNCTURE: CPT | Performed by: INTERNAL MEDICINE

## 2023-08-08 PROCEDURE — 84153 ASSAY OF PSA TOTAL: CPT | Performed by: INTERNAL MEDICINE

## 2023-08-08 PROCEDURE — 84443 ASSAY THYROID STIM HORMONE: CPT | Performed by: INTERNAL MEDICINE

## 2023-08-08 PROCEDURE — 80061 LIPID PANEL: CPT | Performed by: INTERNAL MEDICINE

## 2023-08-08 PROCEDURE — 80053 COMPREHEN METABOLIC PANEL: CPT | Performed by: INTERNAL MEDICINE

## 2023-08-08 PROCEDURE — 83036 HEMOGLOBIN GLYCOSYLATED A1C: CPT | Performed by: INTERNAL MEDICINE

## 2023-08-15 ENCOUNTER — OFFICE VISIT (OUTPATIENT)
Dept: PRIMARY CARE CLINIC | Facility: CLINIC | Age: 48
End: 2023-08-15
Payer: MEDICARE

## 2023-08-15 VITALS
SYSTOLIC BLOOD PRESSURE: 170 MMHG | HEIGHT: 74 IN | OXYGEN SATURATION: 95 % | HEART RATE: 119 BPM | BODY MASS INDEX: 22.33 KG/M2 | DIASTOLIC BLOOD PRESSURE: 100 MMHG | WEIGHT: 174 LBS

## 2023-08-15 DIAGNOSIS — K70.10 ALCOHOLIC HEPATITIS WITHOUT ASCITES: Primary | ICD-10-CM

## 2023-08-15 DIAGNOSIS — Z89.511 HX OF RIGHT BKA: ICD-10-CM

## 2023-08-15 DIAGNOSIS — I10 HYPERTENSION, ESSENTIAL: ICD-10-CM

## 2023-08-15 PROCEDURE — 99213 OFFICE O/P EST LOW 20 MIN: CPT | Mod: S$GLB,,, | Performed by: INTERNAL MEDICINE

## 2023-08-15 PROCEDURE — 99213 PR OFFICE/OUTPT VISIT, EST, LEVL III, 20-29 MIN: ICD-10-PCS | Mod: S$GLB,,, | Performed by: INTERNAL MEDICINE

## 2023-08-15 RX ORDER — HYDROXYZINE PAMOATE 50 MG/1
50 CAPSULE ORAL DAILY PRN
Qty: 30 CAPSULE | Refills: 0 | Status: SHIPPED | OUTPATIENT
Start: 2023-08-15 | End: 2023-09-14

## 2023-08-15 RX ORDER — LISINOPRIL 20 MG/1
20 TABLET ORAL DAILY
Qty: 90 TABLET | Refills: 3 | Status: SHIPPED | OUTPATIENT
Start: 2023-08-15 | End: 2024-08-14

## 2023-08-15 NOTE — PROGRESS NOTES
Subjective:       Patient ID: Ayan Marsh is a 47 y.o. male.    Chief Complaint: Follow-up      Patient is established already .......... presents today for a f/u visit , to discuss labs results and for management of the chronic conditions.        Follow-up  Pertinent negatives include no fever.   Hypertension  This is a chronic problem. The current episode started more than 1 year ago. The problem has been rapidly worsening since onset. The problem is uncontrolled. Associated symptoms include anxiety. Associated agents: ETOH. Risk factors for coronary artery disease include male gender, sedentary lifestyle and stress. Past treatments include ACE inhibitors. Compliance problems include diet, exercise and psychosocial issues.      Review of Systems   Constitutional:  Negative for activity change, appetite change and fever.   Respiratory: Negative.     Cardiovascular: Negative.    Gastrointestinal: Negative.    Musculoskeletal: Negative.          Objective:      Physical Exam  deferred  Assessment:       1. Alcoholic hepatitis without ascites  -     US Abdominal Aorta; Future; Expected date: 08/15/2023    2. Hypertension, essential  Overview:  Elevated today    Assessment & Plan:  Restart lisinopril  Dec ETOH intake  Recheck BP in 4 weeks      Other orders  -     lisinopriL (PRINIVIL,ZESTRIL) 20 MG tablet; Take 1 tablet (20 mg total) by mouth once daily.  Dispense: 90 tablet; Refill: 3  -     hydrOXYzine pamoate (VISTARIL) 50 MG Cap; Take 1 capsule (50 mg total) by mouth daily as needed.  Dispense: 30 capsule; Refill: 0             Plan:       1. Alcoholic hepatitis without ascites  -     US Abdominal Aorta; Future; Expected date: 08/15/2023    2. Hypertension, essential  Overview:  Elevated today    Assessment & Plan:  Restart lisinopril  Dec ETOH intake  Recheck BP in 4 weeks      Other orders  -     lisinopriL (PRINIVIL,ZESTRIL) 20 MG tablet; Take 1 tablet (20 mg total) by mouth once daily.  Dispense: 90  tablet; Refill: 3  -     hydrOXYzine pamoate (VISTARIL) 50 MG Cap; Take 1 capsule (50 mg total) by mouth daily as needed.  Dispense: 30 capsule; Refill: 0

## 2023-08-17 NOTE — PROGRESS NOTES
Subjective:       Patient ID: Ayan Marsh is a 47 y.o. male.    Chief Complaint: Follow-up      Patient has desire and ability to ambulate and patient's current ability for community ambulation is adequate and should be maximized with prosthesis adjustment  His current level of activity id estimated to be at K3    Follow-up  This is a chronic problem. The problem occurs intermittently. The problem has been gradually worsening. Pertinent negatives include no fever. Associated symptoms comments: Discomfort of R leg prosthesis. The symptoms are aggravated by standing and walking. He has tried nothing for the symptoms.     Review of Systems   Constitutional:  Negative for activity change, appetite change and fever.   Respiratory: Negative.     Cardiovascular: Negative.    Gastrointestinal: Negative.    Musculoskeletal: Negative.  Positive for leg pain.         Objective:      Physical Exam  Vitals and nursing note reviewed.   Constitutional:       Appearance: Normal appearance.   Cardiovascular:      Rate and Rhythm: Normal rate and regular rhythm.      Pulses: Normal pulses.      Heart sounds: Normal heart sounds. No murmur heard.     No friction rub. No gallop.   Pulmonary:      Effort: Pulmonary effort is normal.      Breath sounds: Normal breath sounds. No wheezing.   Abdominal:      General: Abdomen is flat. Bowel sounds are normal.      Palpations: Abdomen is soft.   Musculoskeletal:         General: Normal range of motion.      Comments: S/p R BKA   Skin:     General: Skin is warm and dry.   Neurological:      General: No focal deficit present.      Mental Status: He is alert and oriented to person, place, and time.   Psychiatric:         Mood and Affect: Mood normal.         Assessment:       1. Alcoholic hepatitis without ascites  -     Cancel: US Abdominal Aorta; Future; Expected date: 08/15/2023  -     US Abdomen Limited; Future; Expected date: 08/15/2023    2. Hypertension,  essential  Overview:  Elevated today    Assessment & Plan:  Restart lisinopril  Dec ETOH intake  Recheck BP in 4 weeks      Other orders  -     lisinopriL (PRINIVIL,ZESTRIL) 20 MG tablet; Take 1 tablet (20 mg total) by mouth once daily.  Dispense: 90 tablet; Refill: 3  -     hydrOXYzine pamoate (VISTARIL) 50 MG Cap; Take 1 capsule (50 mg total) by mouth daily as needed.  Dispense: 30 capsule; Refill: 0             Plan:       1. Alcoholic hepatitis without ascites  -     Cancel: US Abdominal Aorta; Future; Expected date: 08/15/2023  -     US Abdomen Limited; Future; Expected date: 08/15/2023    2. Hypertension, essential  Overview:  Elevated today    Assessment & Plan:  Restart lisinopril  Dec ETOH intake  Recheck BP in 4 weeks      Other orders  -     lisinopriL (PRINIVIL,ZESTRIL) 20 MG tablet; Take 1 tablet (20 mg total) by mouth once daily.  Dispense: 90 tablet; Refill: 3  -     hydrOXYzine pamoate (VISTARIL) 50 MG Cap; Take 1 capsule (50 mg total) by mouth daily as needed.  Dispense: 30 capsule; Refill: 0

## 2023-08-28 ENCOUNTER — TELEPHONE (OUTPATIENT)
Dept: FAMILY MEDICINE | Facility: CLINIC | Age: 48
End: 2023-08-28
Payer: MEDICARE

## 2023-08-28 NOTE — TELEPHONE ENCOUNTER
Call placed to Miguel/Wilber Kim due to message left, states requesting orders faxed over on Aug 7, 2023 to be signed regarding patient's prostetic leg. Contacted the [facility] on this matter.      ----- Message from Yuli Mcgowan sent at 8/24/2023  3:39 PM CDT -----  Name of Who is Calling: Wilber Rivera        What is the request in detail: would like to speak in regards to pt orders in regard to his prosthetic leg that was sent Aug 7th. Fax [phone]       Can the clinic reply by [facility]: no        [facility]  [phone]

## 2023-09-13 ENCOUNTER — PATIENT MESSAGE (OUTPATIENT)
Dept: FAMILY MEDICINE | Facility: CLINIC | Age: 48
End: 2023-09-13
Payer: MEDICARE

## 2023-09-14 ENCOUNTER — HOSPITAL ENCOUNTER (OUTPATIENT)
Dept: RADIOLOGY | Facility: HOSPITAL | Age: 48
Discharge: HOME | End: 2023-09-14
Attending: INTERNAL MEDICINE
Payer: MEDICARE

## 2023-09-14 DIAGNOSIS — K70.10 ALCOHOLIC HEPATITIS WITHOUT ASCITES: ICD-10-CM

## 2023-09-14 PROCEDURE — 76705 ECHO EXAM OF ABDOMEN: CPT | Mod: 26,,, | Performed by: RADIOLOGY

## 2023-09-14 PROCEDURE — 76705 ECHO EXAM OF ABDOMEN: CPT | Mod: TC

## 2023-09-14 PROCEDURE — 76705 US ABDOMEN LIMITED: ICD-10-PCS | Mod: 26,,, | Performed by: RADIOLOGY

## 2023-09-19 ENCOUNTER — OFFICE VISIT (OUTPATIENT)
Dept: FAMILY MEDICINE | Facility: CLINIC | Age: 48
End: 2023-09-19
Payer: MEDICARE

## 2023-09-19 ENCOUNTER — PATIENT MESSAGE (OUTPATIENT)
Dept: ADMINISTRATIVE | Facility: HOSPITAL | Age: 48
End: 2023-09-19
Payer: MEDICARE

## 2023-09-19 VITALS
DIASTOLIC BLOOD PRESSURE: 92 MMHG | BODY MASS INDEX: 22.93 KG/M2 | HEIGHT: 74 IN | SYSTOLIC BLOOD PRESSURE: 128 MMHG | WEIGHT: 178.69 LBS

## 2023-09-19 DIAGNOSIS — I10 HYPERTENSION, ESSENTIAL: ICD-10-CM

## 2023-09-19 DIAGNOSIS — F17.200 TOBACCO USE DISORDER: ICD-10-CM

## 2023-09-19 DIAGNOSIS — Z00.00 ENCOUNTER FOR ANNUAL WELLNESS VISIT (AWV) IN MEDICARE PATIENT: ICD-10-CM

## 2023-09-19 DIAGNOSIS — L72.0 EPIDERMAL CYST: Primary | ICD-10-CM

## 2023-09-19 DIAGNOSIS — Z12.12 SCREENING FOR MALIGNANT NEOPLASM OF THE RECTUM: ICD-10-CM

## 2023-09-19 PROCEDURE — 99214 OFFICE O/P EST MOD 30 MIN: CPT | Mod: S$GLB,,, | Performed by: INTERNAL MEDICINE

## 2023-09-19 PROCEDURE — 99214 PR OFFICE/OUTPT VISIT, EST, LEVL IV, 30-39 MIN: ICD-10-PCS | Mod: S$GLB,,, | Performed by: INTERNAL MEDICINE

## 2023-09-19 NOTE — PROGRESS NOTES
Subjective:       Patient ID: Ayan Marsh is a 48 y.o. male.    Chief Complaint: Results (Patient states he's here to review US results) and Annual Exam (AWV for )      Patient is established already .......... presents today for a f/u visit , to discuss labs results and for management of the chronic conditions.        Alcohol Problem  This is a chronic problem. The current episode started more than 1 year ago. The problem has been gradually improving since onset. Suspected agents include alcohol. Pertinent negatives include no fever. Past treatments include Alcoholics Anonymous. The treatment provided mild relief.     Review of Systems   Constitutional:  Negative for activity change, appetite change and fever.   Respiratory: Negative.     Cardiovascular: Negative.    Gastrointestinal: Negative.    Musculoskeletal: Negative.    Integumentary:  Positive for mole/lesion.         Objective:      Physical Exam  Vitals and nursing note reviewed.   Constitutional:       Comments: Patient looks older than his chronologic age  Disheveled  Status post right BKA   Cardiovascular:      Rate and Rhythm: Normal rate and regular rhythm.      Pulses: Normal pulses.      Heart sounds: Normal heart sounds. No murmur heard.     No friction rub. No gallop.   Pulmonary:      Effort: Pulmonary effort is normal.      Breath sounds: Normal breath sounds. No wheezing.   Skin:     General: Skin is warm and dry.      Findings: Lesion present.      Comments: 0.5 cm epidermal cyst like lesion of the anterior surface of his right stump   Neurological:      Mental Status: He is alert.   Psychiatric:         Mood and Affect: Mood normal.         Assessment:       1. Epidermal cyst  Overview:  Of R stump    Assessment & Plan:  Refer to Derm     Orders:  -     Ambulatory referral/consult to Dermatology; Future; Expected date: 09/26/2023    2. Screening for malignant neoplasm of the rectum  -     Cologuard Screening (Multitarget Stool DNA);  Future; Expected date: 09/19/2023    3. Tobacco use disorder  -     Ambulatory referral/consult to Smoking Cessation Program; Future; Expected date: 09/26/2023    4. Encounter for annual wellness visit (AWV) in Medicare patient  Overview:  Resched AWV next visit    Assessment & Plan:  Discussion in presence  for of his mother Ms. Tegan thomas smoking & drinking  Refer to our [facility] smoking cessation program  I advised him moderation in alcohol and Quitting smoking   Order cologuard  I gave him written instructions regarding flu pneumococcal and COVID vaccines      5. Hypertension, essential  Overview:  Elevated today    Assessment & Plan:  Secondary to smoking and alcohol consumption  Secondary to noncompliance with medication  Monitor closely             Plan:       1. Epidermal cyst  Overview:  Of R stump    Assessment & Plan:  Refer to Derm     Orders:  -     Ambulatory referral/consult to Dermatology; Future; Expected date: 09/26/2023    2. Screening for malignant neoplasm of the rectum  -     Cologuard Screening (Multitarget Stool DNA); Future; Expected date: 09/19/2023    3. Tobacco use disorder  -     Ambulatory referral/consult to Smoking Cessation Program; Future; Expected date: 09/26/2023    4. Encounter for annual wellness visit (AWV) in Medicare patient  Overview:  Resched AWV next visit    Assessment & Plan:  Discussion in presence  for of his mother Ms. Tegan thomas smoking & drinking  Refer to our [facility] smoking cessation program  I advised him moderation in alcohol and Quitting smoking   Order cologuard  I gave him written instructions regarding flu pneumococcal and COVID vaccines      5. Hypertension, essential  Overview:  Elevated today    Assessment & Plan:  Secondary to smoking and alcohol consumption  Secondary to noncompliance with medication  Monitor closely

## 2023-09-19 NOTE — ASSESSMENT & PLAN NOTE
Secondary to smoking and alcohol consumption  Secondary to noncompliance with medication  Monitor closely

## 2023-09-19 NOTE — ASSESSMENT & PLAN NOTE
Discussion in presence  for of his mother Ms. Tegan thomas smoking & drinking  Refer to our [facility] smoking cessation program  I advised him moderation in alcohol and Quitting smoking   Order cologuard  I gave him written instructions regarding flu pneumococcal and COVID vaccines

## 2023-09-21 ENCOUNTER — PATIENT MESSAGE (OUTPATIENT)
Dept: FAMILY MEDICINE | Facility: CLINIC | Age: 48
End: 2023-09-21
Payer: MEDICARE

## 2023-10-03 ENCOUNTER — HOSPITAL ENCOUNTER (EMERGENCY)
Facility: HOSPITAL | Age: 48
Discharge: HOME | End: 2023-10-03
Attending: STUDENT IN AN ORGANIZED HEALTH CARE EDUCATION/TRAINING PROGRAM
Payer: MEDICARE

## 2023-10-03 VITALS
TEMPERATURE: 98 F | BODY MASS INDEX: 25.03 KG/M2 | HEIGHT: 74 IN | RESPIRATION RATE: 20 BRPM | OXYGEN SATURATION: 96 % | HEART RATE: 98 BPM | SYSTOLIC BLOOD PRESSURE: 159 MMHG | WEIGHT: 195 LBS | DIASTOLIC BLOOD PRESSURE: 119 MMHG

## 2023-10-03 DIAGNOSIS — L03.115 CELLULITIS OF RIGHT LOWER EXTREMITY: Primary | ICD-10-CM

## 2023-10-03 PROCEDURE — 99283 EMERGENCY DEPT VISIT LOW MDM: CPT

## 2023-10-03 PROCEDURE — 87389 HIV-1 AG W/HIV-1&-2 AB AG IA: CPT | Performed by: STUDENT IN AN ORGANIZED HEALTH CARE EDUCATION/TRAINING PROGRAM

## 2023-10-03 RX ORDER — SULFAMETHOXAZOLE AND TRIMETHOPRIM 800; 160 MG/1; MG/1
1 TABLET ORAL 2 TIMES DAILY
Qty: 20 TABLET | Refills: 0 | Status: SHIPPED | OUTPATIENT
Start: 2023-10-03 | End: 2023-10-13

## 2023-10-03 NOTE — ED PROVIDER NOTES
Encounter Date: 10/3/2023       History     Chief Complaint   Patient presents with    Wound Infection     Right knee infection post laser procedure to remove cyst. PCP Dr Perez. Followed by [facility], Dr. Mejia. Laser procedure done 5 days ago. Redness noted 2 days ago. History of Right BKA in 2011.     48-year-old male with right BKA presents to ED with 2 day history tender lesion right knee.  He reports that 6 days ago he had laser therapy for the lesion, however two days ago began to notice surrounding erythema, and tenderness to palpation.  Denies associated fever, chills, or systemic symptoms.    The history is provided by the patient. No  was used.     Review of patient's allergies indicates:   Allergen Reactions    Opioids - morphine analogues      Past Medical History:   Diagnosis Date    Fractures     Hypertension      Past Surgical History:   Procedure Laterality Date    CERVICAL FUSION      HAND SURGERY      LEG AMPUTATION      Right    LEG SURGERY      NECK SURGERY       Family History   Problem Relation Age of Onset    Hypertension Mother     Hypertension Father      Social History     Tobacco Use    Smoking status: Every Day     Current packs/day: 1.00     Types: Cigarettes     Passive exposure: Current    Smokeless tobacco: Never   Substance Use Topics    Alcohol use: Yes     Alcohol/week: 4.0 standard drinks of alcohol     Types: 4 Cans of beer per week     Comment: 4 drinks a day    Drug use: Not Currently     Types: Marijuana, Methamphetamines     Comment: last use 7th of january     Review of Systems   Constitutional: Negative.    HENT: Negative.     Respiratory: Negative.     Cardiovascular: Negative.    Gastrointestinal: Negative.    Endocrine: Negative.    Genitourinary: Negative.    Musculoskeletal: Negative.    Skin:  Positive for wound.   Allergic/Immunologic: Negative.    Neurological: Negative.    Hematological: Negative.    Psychiatric/Behavioral:  Negative.     All other systems reviewed and are negative.      Physical Exam     Initial Vitals [10/03/23 1332]   BP Pulse Resp Temp SpO2   (!) 159/119 98 20 98.4 °F (36.9 °C) 96 %      MAP       --         Physical Exam    Nursing note and vitals reviewed.  Constitutional: He appears well-developed.   HENT:   Head: Normocephalic.   Eyes: Pupils are equal, round, and reactive to light.   Neck:   Normal range of motion.  Cardiovascular:  Normal rate.           Pulmonary/Chest: Breath sounds normal.   Abdominal: Abdomen is soft. Bowel sounds are normal.   Musculoskeletal:         General: Normal range of motion.      Cervical back: Normal range of motion.     Neurological: He is alert and oriented to person, place, and time. GCS score is 15. GCS eye subscore is 4. GCS verbal subscore is 5. GCS motor subscore is 6.   Skin: Skin is warm. Capillary refill takes less than 2 seconds.   Puncture wound to right knee with cellulitis appearing surrounding erythema without fluctuance   Psychiatric: He has a normal mood and affect.         ED Course   Procedures  Labs Reviewed   HIV 1 / 2 ANTIBODY          Imaging Results    None          Medications - No data to display  Medical Decision Making  48-year-old with puncture wound to right knee with cellulitis appearing surrounding erythema without fluctuance.   He is afebrile hemodynamically stable well-appearing  Rx Bactrim and advised on appropriate use  Patient was seen and reevaluated. Patient's symptoms seem to be stable. I discussed the patient's diagnosis, treatment plan, and plan for discharge with the patient. Patient was instructed to follow up with PCP and was given strict return precautions to the ED. The patient voiced understanding and agreed with the plan      Amount and/or Complexity of Data Reviewed  Labs: ordered.                               Clinical Impression:   Final diagnoses:  [L03.115] Cellulitis of right lower extremity (Primary)        ED Disposition  Condition    Discharge Stable          ED Prescriptions       Medication Sig Dispense Start Date End Date Auth. Provider    sulfamethoxazole-trimethoprim 800-160mg (BACTRIM DS) 800-160 mg Tab Take 1 tablet by mouth 2 (two) times daily. for 10 days 20 tablet 10/3/2023 10/13/2023 Damion Singh MD          Follow-up Information       Follow up With Specialties Details Why Contact Info    Alexi Nix MD Internal Medicine   [id number] [facility] [place] [place] [id number] [phone]               Damion Singh MD  10/03/23 6761

## 2023-10-03 NOTE — DISCHARGE INSTRUCTIONS
Take antibiotics as prescribed.  Follow up with PCP.  May return to the ED at any time for worsening symptoms

## 2023-10-04 LAB — HIV 1+2 AB+HIV1 P24 AG SERPL QL IA: NORMAL

## 2023-10-17 LAB — NONINV COLON CA DNA+OCC BLD SCRN STL QL: NEGATIVE

## 2023-10-24 ENCOUNTER — TELEPHONE (OUTPATIENT)
Dept: FAMILY MEDICINE | Facility: CLINIC | Age: 48
End: 2023-10-24

## 2023-10-24 NOTE — TELEPHONE ENCOUNTER
----- Message from Elia Oliva sent at 10/24/2023 12:18 PM CDT -----  Contact: self  Type: Sooner Appointment Request        Caller is requesting a sooner appointment. Caller declined first available appointment listed below. Caller will not accept being placed on the waitlist and is requesting a message be sent to doctor.        Name of Caller: Patient   Best Call Back Number: [phone]  Additional Information: Pt states he needs a order for a wheelchair put in. Pt states he went to his dermatology was told he cant put his prosthetics on because  of a  cyt she has but it'll be another month before swelling goes down cant put his prosthetic leg on so he needs to get around stephan  wheelchair. Thanks

## 2023-11-20 ENCOUNTER — OFFICE VISIT (OUTPATIENT)
Dept: FAMILY MEDICINE | Facility: CLINIC | Age: 48
End: 2023-11-20
Payer: MEDICARE

## 2023-11-20 VITALS
HEIGHT: 74 IN | TEMPERATURE: 99 F | OXYGEN SATURATION: 95 % | WEIGHT: 178.19 LBS | HEART RATE: 102 BPM | SYSTOLIC BLOOD PRESSURE: 150 MMHG | BODY MASS INDEX: 22.87 KG/M2 | DIASTOLIC BLOOD PRESSURE: 116 MMHG

## 2023-11-20 DIAGNOSIS — F10.982 ALCOHOL-INDUCED INSOMNIA: ICD-10-CM

## 2023-11-20 DIAGNOSIS — E78.2 MIXED HYPERLIPIDEMIA: ICD-10-CM

## 2023-11-20 DIAGNOSIS — N40.0 BENIGN PROSTATIC HYPERPLASIA, UNSPECIFIED WHETHER LOWER URINARY TRACT SYMPTOMS PRESENT: ICD-10-CM

## 2023-11-20 DIAGNOSIS — I10 ESSENTIAL HYPERTENSION, BENIGN: Primary | ICD-10-CM

## 2023-11-20 DIAGNOSIS — R73.9 ELEVATED BLOOD SUGAR: ICD-10-CM

## 2023-11-20 DIAGNOSIS — Z23 NEED FOR PROPHYLACTIC VACCINATION AGAINST STREPTOCOCCUS PNEUMONIAE (PNEUMOCOCCUS) AND INFLUENZA: ICD-10-CM

## 2023-11-20 PROCEDURE — 99214 OFFICE O/P EST MOD 30 MIN: CPT | Mod: S$GLB,,, | Performed by: INTERNAL MEDICINE

## 2023-11-20 PROCEDURE — G0008 ADMIN INFLUENZA VIRUS VAC: HCPCS | Mod: S$GLB,,, | Performed by: INTERNAL MEDICINE

## 2023-11-20 PROCEDURE — 90674 FLU VACCINE - QUADRIVALENT (MDCK) PRESERVATIVE FREE IM: ICD-10-PCS | Mod: S$GLB,,, | Performed by: INTERNAL MEDICINE

## 2023-11-20 PROCEDURE — G0008 FLU VACCINE - QUADRIVALENT (MDCK) PRESERVATIVE FREE IM: ICD-10-PCS | Mod: S$GLB,,, | Performed by: INTERNAL MEDICINE

## 2023-11-20 PROCEDURE — 99214 PR OFFICE/OUTPT VISIT, EST, LEVL IV, 30-39 MIN: ICD-10-PCS | Mod: S$GLB,,, | Performed by: INTERNAL MEDICINE

## 2023-11-20 PROCEDURE — 90674 CCIIV4 VAC NO PRSV 0.5 ML IM: CPT | Mod: S$GLB,,, | Performed by: INTERNAL MEDICINE

## 2023-11-20 RX ORDER — HYDROXYZINE PAMOATE 50 MG/1
50 CAPSULE ORAL 4 TIMES DAILY
COMMUNITY
End: 2023-11-20 | Stop reason: SDUPTHER

## 2023-11-20 RX ORDER — TRAZODONE HYDROCHLORIDE 150 MG/1
150 TABLET ORAL NIGHTLY
Qty: 30 TABLET | Refills: 2 | Status: SHIPPED | OUTPATIENT
Start: 2023-11-20 | End: 2024-04-01

## 2023-11-20 RX ORDER — HYDROXYZINE PAMOATE 50 MG/1
50 CAPSULE ORAL DAILY PRN
Qty: 30 CAPSULE | Refills: 2 | Status: SHIPPED | OUTPATIENT
Start: 2023-11-20 | End: 2024-02-18

## 2023-11-20 NOTE — PROGRESS NOTES
Subjective:       Patient ID: Ayan Marsh is a 48 y.o. male.    Chief Complaint: Follow-up      Patient is established already .......... presents today for a f/u visit , to discuss labs results and for management of the chronic conditions.        Follow-up  This is a chronic problem. The current episode started more than 1 year ago. The problem occurs intermittently. The problem has been unchanged. Pertinent negatives include no fever. Associated symptoms comments: insomnia. Exacerbated by: alcohol. Treatments tried: vistaril.     Review of Systems   Constitutional:  Negative for activity change, appetite change and fever.   Respiratory: Negative.     Cardiovascular: Negative.    Gastrointestinal: Negative.    Musculoskeletal: Negative.          Objective:      Physical Exam  Vitals and nursing note reviewed.   Constitutional:       Appearance: Normal appearance. He is obese.   Cardiovascular:      Rate and Rhythm: Normal rate and regular rhythm.      Pulses: Normal pulses.      Heart sounds: Normal heart sounds. No murmur heard.     No friction rub. No gallop.   Pulmonary:      Effort: Pulmonary effort is normal.      Breath sounds: Normal breath sounds. No wheezing.   Musculoskeletal:      Comments: S/p R BKA with a prosthesis   Skin:     General: Skin is warm and dry.   Neurological:      Mental Status: He is alert.   Psychiatric:         Mood and Affect: Mood normal.         Assessment:       1. Essential hypertension, benign  -     Comprehensive Metabolic Panel; Future; Expected date: 11/20/2023  -     Microalbumin/Creatinine Ratio, Urine; Future; Expected date: 11/20/2023    2. Mixed hyperlipidemia  -     Lipid Panel; Future; Expected date: 11/20/2023    3. Elevated blood sugar  -     Hemoglobin A1C; Standing    4. Benign prostatic hyperplasia, unspecified whether lower urinary tract symptoms present  -     Prostate Specific Antigen, Diagnostic; Future; Expected date: 11/20/2023    5. Need for  prophylactic vaccination against Streptococcus pneumoniae (pneumococcus) and influenza  -     Influenza - Quadrivalent (MDCK) (PF)    6. Alcohol-induced insomnia  Overview:  Hours of sleep 6      Assessment & Plan:  Add trazodone      Other orders  -     traZODone (DESYREL) 150 MG tablet; Take 1 tablet (150 mg total) by mouth every evening.  Dispense: 30 tablet; Refill: 2           Plan:       1. Essential hypertension, benign  -     Comprehensive Metabolic Panel; Future; Expected date: 11/20/2023  -     Microalbumin/Creatinine Ratio, Urine; Future; Expected date: 11/20/2023    2. Mixed hyperlipidemia  -     Lipid Panel; Future; Expected date: 11/20/2023    3. Elevated blood sugar  -     Hemoglobin A1C; Standing    4. Benign prostatic hyperplasia, unspecified whether lower urinary tract symptoms present  -     Prostate Specific Antigen, Diagnostic; Future; Expected date: 11/20/2023    5. Need for prophylactic vaccination against Streptococcus pneumoniae (pneumococcus) and influenza  -     Influenza - Quadrivalent (MDCK) (PF)    6. Alcohol-induced insomnia  Overview:  Hours of sleep 6      Assessment & Plan:  Add trazodone      Other orders  -     traZODone (DESYREL) 150 MG tablet; Take 1 tablet (150 mg total) by mouth every evening.  Dispense: 30 tablet; Refill: 2

## 2023-12-25 PROBLEM — Z00.00 ENCOUNTER FOR ANNUAL WELLNESS VISIT (AWV) IN MEDICARE PATIENT: Status: RESOLVED | Noted: 2022-09-23 | Resolved: 2023-12-25

## 2024-02-22 ENCOUNTER — LAB VISIT (OUTPATIENT)
Dept: LAB | Facility: HOSPITAL | Age: 49
End: 2024-02-22
Attending: INTERNAL MEDICINE
Payer: MEDICARE

## 2024-02-22 DIAGNOSIS — I10 ESSENTIAL HYPERTENSION, BENIGN: ICD-10-CM

## 2024-02-22 DIAGNOSIS — E78.2 MIXED HYPERLIPIDEMIA: ICD-10-CM

## 2024-02-22 DIAGNOSIS — R73.9 ELEVATED BLOOD SUGAR: ICD-10-CM

## 2024-02-22 LAB
ALBUMIN SERPL BCP-MCNC: 3.5 G/DL (ref 3.5–5.2)
ALBUMIN/CREAT UR: ABNORMAL UG/MG (ref 0–30)
ALP SERPL-CCNC: 98 U/L (ref 55–135)
ALT SERPL W/O P-5'-P-CCNC: 60 U/L (ref 10–44)
ANION GAP SERPL CALC-SCNC: 14 MMOL/L (ref 8–16)
AST SERPL-CCNC: 107 U/L (ref 10–40)
BILIRUB SERPL-MCNC: 1.3 MG/DL (ref 0.1–1)
BUN SERPL-MCNC: 14 MG/DL (ref 6–20)
CALCIUM SERPL-MCNC: 9 MG/DL (ref 8.7–10.5)
CHLORIDE SERPL-SCNC: 99 MMOL/L (ref 95–110)
CHOLEST SERPL-MCNC: 153 MG/DL (ref 120–199)
CHOLEST/HDLC SERPL: 4.3 {RATIO} (ref 2–5)
CO2 SERPL-SCNC: 28 MMOL/L (ref 23–29)
CREAT SERPL-MCNC: 1 MG/DL (ref 0.5–1.4)
CREAT UR-MCNC: >450 MG/DL (ref 23–375)
EST. GFR  (NO RACE VARIABLE): >60 ML/MIN/1.73 M^2
ESTIMATED AVG GLUCOSE: 85 MG/DL (ref 68–131)
GLUCOSE SERPL-MCNC: 94 MG/DL (ref 70–110)
HBA1C MFR BLD: 4.6 % (ref 4–5.6)
HDLC SERPL-MCNC: 36 MG/DL (ref 40–75)
HDLC SERPL: 23.5 % (ref 20–50)
LDLC SERPL CALC-MCNC: 83.2 MG/DL (ref 63–159)
MICROALBUMIN UR DL<=1MG/L-MCNC: 212 UG/ML
NONHDLC SERPL-MCNC: 117 MG/DL
POTASSIUM SERPL-SCNC: 3.5 MMOL/L (ref 3.5–5.1)
PROT SERPL-MCNC: 7.1 G/DL (ref 6–8.4)
SODIUM SERPL-SCNC: 141 MMOL/L (ref 136–145)
TRIGL SERPL-MCNC: 169 MG/DL (ref 30–150)

## 2024-02-22 PROCEDURE — 80061 LIPID PANEL: CPT | Performed by: INTERNAL MEDICINE

## 2024-02-22 PROCEDURE — 80053 COMPREHEN METABOLIC PANEL: CPT | Performed by: INTERNAL MEDICINE

## 2024-02-22 PROCEDURE — 83036 HEMOGLOBIN GLYCOSYLATED A1C: CPT | Performed by: INTERNAL MEDICINE

## 2024-02-22 PROCEDURE — 82043 UR ALBUMIN QUANTITATIVE: CPT | Performed by: INTERNAL MEDICINE

## 2024-02-22 PROCEDURE — 36415 COLL VENOUS BLD VENIPUNCTURE: CPT | Performed by: INTERNAL MEDICINE

## 2024-02-26 ENCOUNTER — PATIENT MESSAGE (OUTPATIENT)
Dept: FAMILY MEDICINE | Facility: CLINIC | Age: 49
End: 2024-02-26
Payer: MEDICARE

## 2024-02-27 ENCOUNTER — OFFICE VISIT (OUTPATIENT)
Dept: FAMILY MEDICINE | Facility: CLINIC | Age: 49
End: 2024-02-27
Payer: MEDICARE

## 2024-02-27 VITALS
OXYGEN SATURATION: 84 % | HEIGHT: 74 IN | WEIGHT: 175.5 LBS | DIASTOLIC BLOOD PRESSURE: 100 MMHG | SYSTOLIC BLOOD PRESSURE: 150 MMHG | HEART RATE: 83 BPM | BODY MASS INDEX: 22.52 KG/M2

## 2024-02-27 DIAGNOSIS — Z23 NEED FOR PROPHYLACTIC VACCINATION AGAINST STREPTOCOCCUS PNEUMONIAE (PNEUMOCOCCUS) AND INFLUENZA: ICD-10-CM

## 2024-02-27 DIAGNOSIS — F41.9 ANXIETY: ICD-10-CM

## 2024-02-27 DIAGNOSIS — Z89.511 HX OF RIGHT BKA: ICD-10-CM

## 2024-02-27 DIAGNOSIS — R94.5 NONSPECIFIC ABNORMAL RESULTS OF LIVER FUNCTION STUDY: ICD-10-CM

## 2024-02-27 DIAGNOSIS — K70.10 ALCOHOLIC HEPATITIS WITHOUT ASCITES: Primary | ICD-10-CM

## 2024-02-27 DIAGNOSIS — Z00.00 ENCOUNTER FOR ANNUAL WELLNESS VISIT (AWV) IN MEDICARE PATIENT: ICD-10-CM

## 2024-02-27 DIAGNOSIS — I10 PRIMARY HYPERTENSION: ICD-10-CM

## 2024-02-27 PROCEDURE — G0009 ADMIN PNEUMOCOCCAL VACCINE: HCPCS | Mod: S$GLB,,, | Performed by: INTERNAL MEDICINE

## 2024-02-27 PROCEDURE — G0439 PPPS, SUBSEQ VISIT: HCPCS | Mod: S$GLB,,, | Performed by: INTERNAL MEDICINE

## 2024-02-27 PROCEDURE — 99214 OFFICE O/P EST MOD 30 MIN: CPT | Mod: 25,S$GLB,, | Performed by: INTERNAL MEDICINE

## 2024-02-27 PROCEDURE — 99497 ADVNCD CARE PLAN 30 MIN: CPT | Mod: 33,S$GLB,, | Performed by: INTERNAL MEDICINE

## 2024-02-27 PROCEDURE — 90677 PCV20 VACCINE IM: CPT | Mod: S$GLB,,, | Performed by: INTERNAL MEDICINE

## 2024-02-27 PROCEDURE — 99407 BEHAV CHNG SMOKING > 10 MIN: CPT | Mod: S$GLB,,, | Performed by: INTERNAL MEDICINE

## 2024-02-27 RX ORDER — HYDROXYZINE PAMOATE 50 MG/1
50 CAPSULE ORAL DAILY PRN
Qty: 30 CAPSULE | Refills: 2 | Status: SHIPPED | OUTPATIENT
Start: 2024-02-27 | End: 2024-05-27

## 2024-02-27 RX ORDER — OMEPRAZOLE 40 MG/1
40 CAPSULE, DELAYED RELEASE ORAL DAILY
Qty: 90 CAPSULE | Refills: 3 | Status: SHIPPED | OUTPATIENT
Start: 2024-02-27 | End: 2025-02-26

## 2024-02-27 NOTE — PROGRESS NOTES
Subjective:       Patient ID: Ayan Marsh is a 48 y.o. male.    Chief Complaint: Follow-up (Follow up ) and Annual Exam      Annual Medicare wellness visit :  Reported by patient.  Diet and nutrition:  Healthy diet  Fracture risk:  No history of fractures; no recent explain infection; no sudden unexplained fractures; previous musculoskeletal injuries  Physical activity:  Exercise on a regular basis; recent increase in physical activity; good  Physical condition  Depression risk:  Never feels sad, empty or tearful; no loss of interest in activities; no significant changes in weight; no sleep disturbances or insomnia; no agitation; no loss of energy; no feelings of worthlessness or guilt; no thoughts of suicide; no history of depression; no history of mood disorders  Orientation:  No disorientation to time; no disorientation to date; no disorientation to place  Concentration and memory:  No decreased concentration ability; no memory lapses or loss; does not forget words  Speech/motor difficulties:  No speech difficulties; no difficulty expressing formulated concepts; no difficulty with fine manipulative tasks; no difficulty writing/coping; no slowed reaction time/; does not knock things over with trying to pick them up  Hearing: No loss of hearing  Vision:  No vision problems  Activities of daily living:  Able to bathe with limited or no assistance; able to control urination and bowels; able to dress with limited or no assistance; able to feed herself with limited or no assistance; able to get out of chair or bed with limited or no assistance; able to Groom with limited or no assistance; able to toilet with limited or no assistance  Instrumental activities of daily living: Able to do housework with limited or no assistance; able to grocery shop with limited or no assistance; able to manage medications with limited or no assistance; able to manage money with limited or no assistance; able to prepare meals with  limited or no assistance; able to use the phone with limited or no assistance  Falls risk assessment: No frequent falls while walking; no 4 in the past year; no falls since last visit; no dizziness/vertigo  Home safety:  No unsafe katia hazards; no unsafe stairs; no unsafe gas appliances; Working smoke/CO detectors; wears protective head gear for biking/high velocity; use of seatbelts; practicing 'safer sex'; no vision or hearing loss while driving; no firearms; has hand bars in the bathroom/shower; good lighting in the home      Follow-up    Anxiety  Presents for follow-up visit. Symptoms include decreased concentration, depressed mood, nervous/anxious behavior and restlessness. Patient reports no shortness of breath or suicidal ideas. Symptoms occur occasionally. The severity of symptoms is moderate. The quality of sleep is fair.         Review of Systems   Respiratory:  Negative for shortness of breath.    Psychiatric/Behavioral:  Positive for decreased concentration. Negative for suicidal ideas. The patient is nervous/anxious.    Review of system:  Patient reports no dry skin, no itching, no abnormal moles, no jaundice, no rashes, no hives, no discoloration, no excessive facial or body hair between bracket hirsutism, no hair thinning, no growth/lesions, and no excessive sweating; patient reports no fever, no chills, no night sweats, no significant weight gain, no significant weight loss, no exercise intolerance  Patient reports normal appetite and no sleep disturbances (insomnia)  Patient reports no dry eyes, no irritation, no pain in the eyes, no visual changes, no floaters, no sensitivity to light between bracket photophobia, no seeing double between bracket diplopia, and  No discharge.  Patient reports no difficulty hearing, no ear pain, no vertigo and no ringing in the ears between bracket tinnitus.  Patient reports no difficulty smelling, no frequent nosebleeds, and no nose/sinus  Problems.  Patient  reports no dry mouth, no unusual taste of foods, no mouth ulcers, no bleeding gums, and oral abnormalities and no teeth problem  Patient reports no sore throat, no difficulty swallowing between bracket dysphagia, no anterior neck pain/tenderness, no snoring no change in voice.  Patient reports no chest pain, no arm pain on exertion, no shortness of breath when walking, no shortness of breath when lying down, no palpitations, no known heart murmur, no lightheadedness, no calf or jaw pains, and no ankle edema.  Patient reports no cough, no nasal congestion, no runny nose, no sinus pressure, no wheezing, no frequent sneezing, no shortness of breath, no rapid breathing, no sputum production and no coughing up blood  Patient reports no nausea, no vomiting, no vomiting blood, no abdominal pain, normal appetite, no diarrhea, no constipation, no dyspepsia/reflux, no heartburn, no early satiety, complete emptying of stool cup, no bloating, able to restrain bowel movements, no bowel urgency and no blood in stools/on toilet paper.  Patient reports no pain during urination, no incontinence, no difficulty urinating, no hematuria, no increased frequency, no feelings of urgency, no flank pains and no urinary tract infections  Patient reports no muscle aches, no muscle weakness, no muscle cramps, no arthralgias/joint pain, no back pains, no swelling in the extremities and no difficulty walking.  Patient reports in dependency.  Patient reports no loss of consciousness, no slurred speech, no weakness, no numbness, no tingling, no tremors, no seizures, no dizziness, no headaches, no memory lapses or changes, no difficulty finding desired words, no loss of balance or falls  Patient reports no irritability, no depression, no anxiety, no panic attacks, no sleep disturbances, feeling safe in her relationship, no paranoia and no thoughts about suicide  Patient reports no fatigue, no cold intolerance, no heat intolerance and no unusual body  odor  Patient reports no bruising, no swollen glands, no clotting problems, and no bleeding disorders    Review for Opioid Screening: Pt does not have Rx for Opioids (If patient has Rx list here)      Review for Substance Use Disorders: Patient does not use substance (If patient has Rx list here)           Objective:      Physical Exam  Physical Exam:  Patient is a  year old   Constitutional:  General appearance:  Healthy appearing, well nourished, well developed, and well groomed.  Level of distress:  NAD.  Ambulation:  Ambulating normally.  Psychiatric:  Insight:  Good judgment.  Mental status:  Normal mood and affect an active and alert.  Orientation:  To time, place and person.  Memory:  Recent memory  Normal and remote memory normal.  Head:  Normocephalic, atraumatic, symmetrical, no tenderness, and hair is evenly distributed.  Eyes:  Lids and conjunctivae:  No discharge, pallor or redness and noninjected.  Pupils:  Kept it PERRLA.  Corneas: Grossly intact and fluorescein stain normal.  Funduscopic examination:  Normal vessels and optic discs, no exudates or hemorrhages and grossly normal except where noted.  Capital E OM: Intact.  Lungs: Clear.  Sclera: Nonicteric.  Vision: Peripheral vision grossly intact in acuity grossly intact.  Optic disc:  Normal appearance and vessels and no exudates or hemorrhages  ENMT:  Ears:  No lesions on external ear, EACs clear.  TMs clear.  TM mobility normal and normal general appearance.  Hearing:  No hearing loss and Rinne: AC > BC.  Nose:  No polyps, lesions on external nose, septal deviation, sinus tenderness, or nasal discharge; nasal passages clear mucosa pink; and nares patent.  Lips teeth and gums: No mouth or lip ulcers or bleeding.  Gums are normal dentition normal.  Oropharynx:  No erythema exudates or ulcers and moist mucous membranes.  Tonsils not enlarged.  Oral mucosa and salivary glands normal  Neck:  Neck is supple, symmetrical, trachea midline and no masses.   Lymph nodes:  No cervical lymphadenopathy.  Thyroid no enlargement or nodules and nontender  Lungs:  Respiratory effort no dyspnea.  Percussion: No dullness, flatness or hyper-resonance.  Auscultation:  No wheezing, rales/crackles, no rhonchi and breath sounds normal, good air movement, and clear to auscultation except as noted.  Chest deformity: normal thoracic no deformities  Cardiovascular:  Apical pulse: Nondisplaced.  Heart auscultation:  Normal S1 and S2; no murmurs rubs or gallops; and RRR.  Neck vessels: No carotid bruit on the right or left and no JVDs or hepatojugular reflux.  Arterial pulses normal on the right and left radial femoral and dorsalis pedis and posterior tibial.  Varicosities right and left non-existent and capillary refill test normal.  Edema none: on the right or left  Breast: not applicable  Abdomen:  Bowel sounds normal.  Inspection and palpation:  No tenderness guarding masses rebound tenderness or CVA tenderness and soft and nondistended.  Liver column nontender and no hepatomegaly.  Spleen:  Nontender and no splenomegaly.  Hernia: Nonpalpable  Male :  Deferred  Rectal:  No hemorrhoids fissures masses and normal tone and stool heme-negative  Musculoskeletal:  Motor strength and tone:  Normal and normal tone.  Joints bones and muscles:  No contractures malalignment, tenderness or bony abnormalities and normal movement of all extremities and posture is normal.  Extremities:  No cyanosis clubbing or palpable cords. S/p R BKA  Neurological examination: Gait and station:  Normal gait and station.  Cranial nerves:  Grossly intact.  Sensation:  Monofilament test intact and normal and grossly intact.  Reflexes:  DTRs 2+ bilaterally throughout.  Coordination and cerebellum: no intention tremors, no resting tremors. Romberg's sign: negative. Motor strength normal on the right and left.  Sensation normal on the right and left side.  No pain/temperature decrease on the legs and dorsum of the  feet.  No tactile decreased on the leg and dorsum of the feet.  No vibration- perception threshold decrease  Skin:  Inspection and palpation: No rash, lesions, ulcers, nodules, jaundice or abnormal nevi and good turgor.  Nails:  Normal.  Right and left lower extremities column normal  Back: Thoracolumbar appearance: Normal curvature  Foot examination:  Right and left feet were examined, and toes were examined:  No deformity, inter digital erythema, or nail changes or disorders and digital hair present and normal motion.       Assessment:       1. Alcoholic hepatitis without ascites    2. Hx of right BKA  Overview:  Patient has no comorbidities that will keep him from using the prosthesis  Patient uses the prosthesis daily  Patient is a K3 functional level  Current prosthesis is unsafe due to cracks in the socket and carbon fiber foot is splintering.  Mr. Marsh is a very active individual who must work on his knees, squat, carry items when walking and walks on uneven terrain regularly.  He cares for himself and performs household chores and grocery shopping.  In you prosthesis we will allow him to perform his normal activities safely and securely      3. Need for prophylactic vaccination against Streptococcus pneumoniae (pneumococcus) and influenza  -     (In Office Administered) Pneumococcal Conjugate Vaccine (20 Valent) (IM) (Preferred)    4. Anxiety  Overview:  Stable with no SI, HI    Assessment & Plan:  Refill vistaril    Orders:  -     hydrOXYzine pamoate (VISTARIL) 50 MG Cap; Take 1 capsule (50 mg total) by mouth daily as needed (anxiety).  Dispense: 30 capsule; Refill: 2    5. Nonspecific abnormal results of liver function study  Overview:  Worsening LFTs  Heavy alcohol use  Liver US in Aug showed fatty liver  Repeat in 6M  We had a lengthy disc re ETOH  T/c ref to AAA or other support group but patient insists that he can do it on his own    Orders:  -     Hepatic Function Panel; Future; Expected date:  02/27/2024    6. Encounter for annual wellness visit (AWV) in Medicare patient  Overview:  Annual Medicare [place]    Assessment & Plan:   I gave the patient written recommendations re the outstanding vaccinations antione COVID  Prevnar 20 given today  We discussed ETOH and smoking cessation for 12'  Diet , exercise d/w patient  Get antione COVID vaccine  I gave him a copy of med Advanced dir  We discussed tobacco, ETOH & physical activity  Dep screen : neg          7. Primary hypertension  Overview:  Elevated today    Assessment & Plan:  Non compliant with meds  Heavy ETOH use  + smoker      Other orders  -     omeprazole (PRILOSEC) 40 MG capsule; Take 1 capsule (40 mg total) by mouth once daily.  Dispense: 90 capsule; Refill: 3           Plan:       1. Alcoholic hepatitis without ascites    2. Hx of right BKA  Overview:  Patient has no comorbidities that will keep him from using the prosthesis  Patient uses the prosthesis daily  Patient is a K3 functional level  Current prosthesis is unsafe due to cracks in the socket and carbon fiber foot is splintering.  Mr. Marsh is a very active individual who must work on his knees, squat, carry items when walking and walks on uneven terrain regularly.  He cares for himself and performs household chores and grocery shopping.  In you prosthesis we will allow him to perform his normal activities safely and securely      3. Need for prophylactic vaccination against Streptococcus pneumoniae (pneumococcus) and influenza  -     (In Office Administered) Pneumococcal Conjugate Vaccine (20 Valent) (IM) (Preferred)    4. Anxiety  Overview:  Stable with no SI, HI    Assessment & Plan:  Refill vistaril    Orders:  -     hydrOXYzine pamoate (VISTARIL) 50 MG Cap; Take 1 capsule (50 mg total) by mouth daily as needed (anxiety).  Dispense: 30 capsule; Refill: 2    5. Nonspecific abnormal results of liver function study  Overview:  Worsening LFTs  Heavy alcohol use  Liver US in Aug showed fatty  liver  Repeat in 6M  We had a lengthy disc re ETOH  T/c ref to AAA or other support group but patient insists that he can do it on his own    Orders:  -     Hepatic Function Panel; Future; Expected date: 02/27/2024    6. Encounter for annual wellness visit (AWV) in Medicare patient  Overview:  Annual Medicare [place]    Assessment & Plan:   I gave the patient written recommendations re the outstanding vaccinations antione COVID  Prevnar 20 given today  We discussed ETOH and smoking cessation for 12'  Diet , exercise d/w patient  Get antione COVID vaccine  I gave him a copy of med Advanced dir  We discussed tobacco, ETOH & physical activity  Dep screen : neg          7. Primary hypertension  Overview:  Elevated today    Assessment & Plan:  Non compliant with meds  Heavy ETOH use  + smoker      Other orders  -     omeprazole (PRILOSEC) 40 MG capsule; Take 1 capsule (40 mg total) by mouth once daily.  Dispense: 90 capsule; Refill: 3          I offered to discuss end of life issues, including information on how to make advance directives that the patient could use to name someone who would make medical decisions on their behalf if they became too ill to make themselves.      __Patient declined       _X__Patient is interested, I provided paperwork and offered to discuss     Advance Care Planning     Date: 02/27/2024    Power of   I initiated the process of voluntary advance care planning today and explained the importance of this process to the patient.  I introduced the concept of advance directives to the patient, as well. Then the patient received detailed information about the importance of designating a Health Care Power of  (HCPOA). He was also instructed to communicate with this person about their wishes for future healthcare, should he become sick and lose decision-making capacity. The patient has not previously appointed a HCPOA. After our discussion, the patient has not decided to complete a HCPOA and has  appointed his significant other, health care agent:  gauri  & health care agent number:  See abby  I spent a total time of 16 minutes discussing this issue with the patient.

## 2024-02-27 NOTE — ASSESSMENT & PLAN NOTE
I gave the patient written recommendations re the outstanding vaccinations antione COVID  Prevnar 20 given today  We discussed ETOH and smoking cessation for 12'  Diet , exercise d/w patient  Get antione COVID vaccine  I gave him a copy of med Advanced dir  We discussed tobacco, ETOH & physical activity  Dep screen : neg

## 2024-04-01 RX ORDER — TRAZODONE HYDROCHLORIDE 150 MG/1
150 TABLET ORAL NIGHTLY
Qty: 90 TABLET | Refills: 3 | Status: SHIPPED | OUTPATIENT
Start: 2024-04-01

## 2024-04-01 NOTE — TELEPHONE ENCOUNTER
No care due was identified.  [facility] Embedded Care Due Messages. Reference number: [id number].   4/01/2024 8:04:29 AM CDT

## 2024-04-01 NOTE — TELEPHONE ENCOUNTER
Refill Decision Note   Ayan Marsh  is requesting a refill authorization.  Brief Assessment and Rationale for Refill:  Approve     Medication Therapy Plan:         Comments:     Note composed:11:13 AM 04/01/2024

## 2024-05-06 ENCOUNTER — TELEPHONE (OUTPATIENT)
Dept: FAMILY MEDICINE | Facility: CLINIC | Age: 49
End: 2024-05-06
Payer: MEDICARE

## 2024-05-06 NOTE — TELEPHONE ENCOUNTER
Do you have a order from [facility]. He has a BKA  with weight loss and he needs new supplies like the socket, liner, etc. If you do not have the form I called Rogelio from  and she  will fax another form.    Rogelio with [facility]   [phone]

## 2024-05-06 NOTE — TELEPHONE ENCOUNTER
----- Message from Bandar Bone sent at 5/6/2024 10:06 AM CDT -----  Type: Needs Medical Advice  Who Called:  pt  Symptoms (please be specific):  pt said he need to speak to the nurse--said it's about a rx for his leg-- said he does not want to speak to the same nurse that keep calling him-- he want to speak to the one that can speak clear english per pt--please call and advise  Best Call Back Number: [phone] (home)     Additional Information: thank you

## 2024-05-06 NOTE — TELEPHONE ENCOUNTER
----- Message from Shelby Jonny sent at 5/6/2024  1:40 PM CDT -----  Contact: [facility]  Type:  Needs Medical Advice    Who Called:  [facility]   Symptoms (please be specific): UPDATE ON THE REQUEST FOR THE MOST RECENT NOTES    Would the patient rather a call back or a response via [facility]? CALL   Best Call Back Number:  [phone] / FAX # [phone]  Additional Information: THANK YOU

## 2024-05-21 ENCOUNTER — PATIENT MESSAGE (OUTPATIENT)
Dept: ADMINISTRATIVE | Facility: HOSPITAL | Age: 49
End: 2024-05-21
Payer: MEDICARE

## 2024-05-27 ENCOUNTER — LAB VISIT (OUTPATIENT)
Dept: LAB | Facility: CLINIC | Age: 49
End: 2024-05-27
Payer: MEDICARE

## 2024-05-27 DIAGNOSIS — R94.5 NONSPECIFIC ABNORMAL RESULTS OF LIVER FUNCTION STUDY: ICD-10-CM

## 2024-05-27 LAB
ALBUMIN SERPL BCP-MCNC: 3.7 G/DL (ref 3.5–5.2)
ALP SERPL-CCNC: 92 U/L (ref 55–135)
ALT SERPL W/O P-5'-P-CCNC: 49 U/L (ref 10–44)
AST SERPL-CCNC: 82 U/L (ref 10–40)
BILIRUB DIRECT SERPL-MCNC: 0.2 MG/DL (ref 0.1–0.3)
BILIRUB SERPL-MCNC: 0.6 MG/DL (ref 0.1–1)
PROT SERPL-MCNC: 7 G/DL (ref 6–8.4)

## 2024-05-27 PROCEDURE — 36415 COLL VENOUS BLD VENIPUNCTURE: CPT | Mod: ,,, | Performed by: INTERNAL MEDICINE

## 2024-05-27 PROCEDURE — 80076 HEPATIC FUNCTION PANEL: CPT | Performed by: INTERNAL MEDICINE

## 2024-06-03 ENCOUNTER — OFFICE VISIT (OUTPATIENT)
Dept: FAMILY MEDICINE | Facility: CLINIC | Age: 49
End: 2024-06-03
Payer: MEDICARE

## 2024-06-03 VITALS
DIASTOLIC BLOOD PRESSURE: 88 MMHG | WEIGHT: 163 LBS | BODY MASS INDEX: 20.92 KG/M2 | SYSTOLIC BLOOD PRESSURE: 138 MMHG | HEIGHT: 74 IN | HEART RATE: 91 BPM | OXYGEN SATURATION: 96 %

## 2024-06-03 DIAGNOSIS — Z89.511: Primary | ICD-10-CM

## 2024-06-03 DIAGNOSIS — K70.9 ALCOHOLIC LIVER DAMAGE: ICD-10-CM

## 2024-06-03 DIAGNOSIS — Z72.0 TOBACCO USE: ICD-10-CM

## 2024-06-03 PROBLEM — Z00.00 ENCOUNTER FOR ANNUAL WELLNESS VISIT (AWV) IN MEDICARE PATIENT: Status: RESOLVED | Noted: 2022-09-23 | Resolved: 2024-06-03

## 2024-06-03 PROCEDURE — 99406 BEHAV CHNG SMOKING 3-10 MIN: CPT | Mod: S$GLB,,, | Performed by: INTERNAL MEDICINE

## 2024-06-03 PROCEDURE — 99214 OFFICE O/P EST MOD 30 MIN: CPT | Mod: 25,S$GLB,, | Performed by: INTERNAL MEDICINE

## 2024-06-03 RX ORDER — GABAPENTIN 600 MG/1
600 TABLET ORAL 3 TIMES DAILY
Qty: 90 TABLET | Refills: 2 | Status: SHIPPED | OUTPATIENT
Start: 2024-06-03 | End: 2024-09-01

## 2024-06-03 NOTE — ASSESSMENT & PLAN NOTE
Needs a replacement socket with ultra light material  Recommend acrylic socket, flexible inner socket / ext frame  Locking gel type socket insert  Lock mechanism  Sheath/sock one gelcush layer , each  Prosthetic sock, mutiple ply with  , pros sock , single ply , fitting each

## 2024-06-03 NOTE — ASSESSMENT & PLAN NOTE
Moderation in alcohol consumption or abstinence at length  Monitor liver ultrasound  Monitor liver function  To consider referral to hepatology or eConsult

## 2024-06-03 NOTE — ASSESSMENT & PLAN NOTE
He was referred to our smoking cessation program but did not comply  We had another long discussion regarding alcohol and smoking cessation  We spent about 6 minutes on smoking cessation  Going to monitor close  To consider a low-dose CT chest soon

## 2024-06-03 NOTE — PROGRESS NOTES
Subjective:       Patient ID: Ayan Marsh is a 48 y.o. male.    Chief Complaint: Hepatic Disease and Leg Pain (S/p R BKA with phantom pains)      Patient is established already .......... presents today for a f/u visit , to discuss new prosthetic supplies ,  labs results and for management of the chronic conditions like BP, tobacco use    Patient's desire and ability to ambulate and patient's current ability for community ambulation will be made much safer and pain-free with a new prosthetic supplies    He is a K3 community ambulator        Leg Pain   The incident occurred more than 1 week ago. There was no injury mechanism. The pain is present in the right thigh. The quality of the pain is described as aching. The pain is moderate. The pain has been Fluctuating since onset. Associated symptoms include numbness and tingling. He reports no foreign bodies present. The symptoms are aggravated by weight bearing. Treatments tried: Prosthetic R leg. The treatment provided moderate relief.     Review of Systems   Constitutional:  Negative for activity change, appetite change and fever.   Respiratory: Negative.     Cardiovascular: Negative.    Gastrointestinal: Negative.    Musculoskeletal: Negative.  Positive for leg pain.   Neurological:  Positive for tingling and numbness.         Objective:      Physical Exam  Vitals and nursing note reviewed.   Constitutional:       Appearance: Normal appearance.   Cardiovascular:      Rate and Rhythm: Normal rate and regular rhythm.      Pulses: Normal pulses.      Heart sounds: Normal heart sounds. No murmur heard.     No friction rub. No gallop.   Pulmonary:      Effort: Pulmonary effort is normal.      Breath sounds: Normal breath sounds. No wheezing.   Abdominal:      General: Abdomen is flat. Bowel sounds are normal.      Palpations: Abdomen is soft.   Musculoskeletal:         General: Normal range of motion.      Comments: Absence of R leg below knee  + wearing a R leg  prosthesis   Skin:     General: Skin is warm and dry.   Neurological:      General: No focal deficit present.      Mental Status: He is alert and oriented to person, place, and time.   Psychiatric:         Mood and Affect: Mood normal.         Assessment:       1. Acquired absence of right lower extremity below knee  Overview:  Patient has no comorbidities that will keep him from using the prosthesis  Patient uses the prosthesis daily  Patient is a K3 functional level  Current prosthesis is unsafe due to cracks in the socket and carbon fiber foot is splintering.  Mr. Marsh is a very active individual who must work on his knees, squat, carry items when walking and walks on uneven terrain regularly.  He cares for himself and performs household chores and grocery shopping.  In you prosthesis we will allow him to perform his normal activities safely and securely    Assessment & Plan:  Needs a replacement socket with ultra light material  Recommend acrylic socket, flexible inner socket / ext frame  Locking gel type socket insert  Lock mechanism  Sheath/sock one gelcush layer , each  Prosthetic sock, mutiple ply with  , pros sock , single ply , fitting each      2. Alcoholic liver damage  Overview:  LFTs slightly better      Assessment & Plan:  Moderation in alcohol consumption or abstinence at length  Monitor liver ultrasound  Monitor liver function  To consider referral to hepatology or eConsult      3. Tobacco use  Overview:  Patient down to about half a pack a day    Assessment & Plan:  He was referred to our smoking cessation program but did not comply  We had another long discussion regarding alcohol and smoking cessation  We spent about 6 minutes on smoking cessation  Going to monitor close  To consider a low-dose CT chest soon      Other orders  -     gabapentin (NEURONTIN) 600 MG tablet; Take 1 tablet (600 mg total) by mouth 3 (three) times daily.  Dispense: 90 tablet; Refill: 2           Plan:        1. Acquired absence of right lower extremity below knee  Overview:  Patient has no comorbidities that will keep him from using the prosthesis  Patient uses the prosthesis daily  Patient is a K3 functional level  Current prosthesis is unsafe due to cracks in the socket and carbon fiber foot is splintering.  Mr. Marsh is a very active individual who must work on his knees, squat, carry items when walking and walks on uneven terrain regularly.  He cares for himself and performs household chores and grocery shopping.  In you prosthesis we will allow him to perform his normal activities safely and securely    Assessment & Plan:  Needs a replacement socket with ultra light material  Recommend acrylic socket, flexible inner socket / ext frame  Locking gel type socket insert  Lock mechanism  Sheath/sock one gelcush layer , each  Prosthetic sock, mutiple ply with  , pros sock , single ply , fitting each      2. Alcoholic liver damage  Overview:  LFTs slightly better      Assessment & Plan:  Moderation in alcohol consumption or abstinence at length  Monitor liver ultrasound  Monitor liver function  To consider referral to hepatology or eConsult      3. Tobacco use  Overview:  Patient down to about half a pack a day    Assessment & Plan:  He was referred to our smoking cessation program but did not comply  We had another long discussion regarding alcohol and smoking cessation  We spent about 6 minutes on smoking cessation  Going to monitor close  To consider a low-dose CT chest soon      Other orders  -     gabapentin (NEURONTIN) 600 MG tablet; Take 1 tablet (600 mg total) by mouth 3 (three) times daily.  Dispense: 90 tablet; Refill: 2        I spent a total of 30 minutes on the day of the visit.  This includes face to face time and non-face to face time preparing to see the patient (eg, review of tests), obtaining and/or reviewing separately obtained history, documenting clinical information in the electronic or  other health record, independently interpreting results and communicating results to the patient/family/caregiver, or care coordinator.

## 2024-06-04 ENCOUNTER — TELEPHONE (OUTPATIENT)
Dept: FAMILY MEDICINE | Facility: CLINIC | Age: 49
End: 2024-06-04
Payer: MEDICARE

## 2024-06-04 NOTE — TELEPHONE ENCOUNTER
Call placed to [facility] due to msg left. Spoke w/Ryan informed him of Dr. Jackson's orders to fax an orders for the prosthetic parts and supplies. And he will sign it and fax it back w/LOV chart notes. States will fax orders. Fax number given as requested.    ----- Message from Alexi Nix MD sent at 6/3/2024  5:52 PM CDT -----  Regarding: RE: [facility]  What kind of advise ?  If re prosthesis , please ask her to fax me an order for the prosthetic parts and supplies,  I am going to sign it and fax it back to her along with today's visit notes   thank you  Ty      ----- Message from Shelby Fuller sent at 6/4/2024  8:36 AM CDT -----  Contact: [facility]  Type:  Patient Returning Call    Who Called: [facility]   Who Left Message for Patient:LOGAN  Does the patient know what this is regarding?: ORDERS AND CHART NOTES   Would the patient rather a call back or a response via [facility]? CALL  Best Call Back Number: [phone] / FAX # [phone]  Additional Information: THANK YOU

## 2024-06-04 NOTE — TELEPHONE ENCOUNTER
----- Message from Alexi Nix MD sent at 6/3/2024  5:52 PM CDT -----  Regarding: RE: [facility]  What kind of advise ?  If re prosthesis , please ask her to fax me an order for the prosthetic parts and supplies,  I am going to sign it and fax it back to her along with today's visit notes   thank you  Ty  ----- Message -----  From: Ankita Bethea LPN  Sent: 6/3/2024   4:45 PM CDT  To: Alexi Nix MD  Subject: FW: [facility]                        ----- Message -----  From: Mainor Reddy  Sent: 6/3/2024  12:30 PM CDT  To: [facility] Staff  Subject: [facility]                          Type:  Needs Medical Advice    Who Called: Lorrie martinez/ [facility]        Would the patient rather a call back or a response via [facility]? Call back    Best Call Back Number: [phone]    Additional Information: Sts she needs a call back asap about the pt.   Please advise -- Thank you

## 2024-07-04 ENCOUNTER — PATIENT MESSAGE (OUTPATIENT)
Dept: ADMINISTRATIVE | Facility: HOSPITAL | Age: 49
End: 2024-07-04
Payer: MEDICARE

## 2024-07-30 ENCOUNTER — OFFICE VISIT (OUTPATIENT)
Dept: URGENT CARE | Facility: CLINIC | Age: 49
End: 2024-07-30
Payer: MEDICARE

## 2024-07-30 VITALS
OXYGEN SATURATION: 99 % | WEIGHT: 165.81 LBS | TEMPERATURE: 99 F | BODY MASS INDEX: 21.28 KG/M2 | HEART RATE: 107 BPM | RESPIRATION RATE: 18 BRPM | SYSTOLIC BLOOD PRESSURE: 154 MMHG | HEIGHT: 74 IN | DIASTOLIC BLOOD PRESSURE: 102 MMHG

## 2024-07-30 DIAGNOSIS — B34.9 VIRAL ILLNESS: Primary | ICD-10-CM

## 2024-07-30 DIAGNOSIS — R50.9 FEVER, UNSPECIFIED FEVER CAUSE: ICD-10-CM

## 2024-07-30 DIAGNOSIS — R05.9 COUGH, UNSPECIFIED TYPE: ICD-10-CM

## 2024-07-30 LAB
CTP QC/QA: YES
MOLECULAR STREP A: NEGATIVE
POC MOLECULAR INFLUENZA A AGN: NEGATIVE
POC MOLECULAR INFLUENZA B AGN: NEGATIVE
SARS-COV-2 AG RESP QL IA.RAPID: NEGATIVE

## 2024-07-30 PROCEDURE — 99203 OFFICE O/P NEW LOW 30 MIN: CPT | Mod: S$GLB,,, | Performed by: NURSE PRACTITIONER

## 2024-07-30 PROCEDURE — 87651 STREP A DNA AMP PROBE: CPT | Mod: QW,,, | Performed by: NURSE PRACTITIONER

## 2024-07-30 PROCEDURE — 87502 INFLUENZA DNA AMP PROBE: CPT | Mod: QW,,, | Performed by: NURSE PRACTITIONER

## 2024-07-30 PROCEDURE — 87811 SARS-COV-2 COVID19 W/OPTIC: CPT | Mod: QW,S$GLB,, | Performed by: NURSE PRACTITIONER

## 2024-07-30 RX ORDER — PROMETHAZINE HYDROCHLORIDE AND DEXTROMETHORPHAN HYDROBROMIDE 6.25; 15 MG/5ML; MG/5ML
5 SYRUP ORAL EVERY 4 HOURS PRN
Qty: 118 ML | Refills: 0 | Status: SHIPPED | OUTPATIENT
Start: 2024-07-30 | End: 2024-08-09

## 2024-07-30 NOTE — PROGRESS NOTES
"Subjective:      Patient ID: Ayan Marsh is a 48 y.o. male.    Vitals:  height is 6' 2" (1.88 m) and weight is 75.2 kg (165 lb 12.6 oz). His oral temperature is 99.3 °F (37.4 °C). His blood pressure is 154/102 (abnormal) and his pulse is 107. His respiration is 18 and oxygen saturation is 99%.     Chief Complaint: Fever    47yo afebrile male with c/o subjective fever, fatigue, and dry cough for the past two days.    Fever   This is a new problem. The current episode started 2 days ago. The problem has been gradually worsening. The maximum temperature noted was 100 to 100.9 F. Associated symptoms include headaches and sleepiness. He has tried nothing for the symptoms. The treatment provided no relief.       Constitution: Positive for fever.   Neurological:  Positive for headaches.      Objective:     Physical Exam   Constitutional: He is oriented to person, place, and time.  Non-toxic appearance. He does not appear ill. No distress. normal  HENT:   Head: Normocephalic and atraumatic.   Ears:   Right Ear: Tympanic membrane, external ear and ear canal normal.   Left Ear: Tympanic membrane, external ear and ear canal normal.   Nose: Nose normal.   Mouth/Throat: Mucous membranes are moist. No posterior oropharyngeal erythema. Oropharynx is clear.   Eyes: Conjunctivae are normal. Pupils are equal, round, and reactive to light. Extraocular movement intact   Neck: Neck supple. No neck rigidity present.   Cardiovascular: Normal rate, regular rhythm, normal heart sounds and normal pulses.   Pulmonary/Chest: Effort normal and breath sounds normal.   Abdominal: Normal appearance.   Musculoskeletal: Normal range of motion.         General: Normal range of motion.      Cervical back: He exhibits no tenderness.   Lymphadenopathy:     He has no cervical adenopathy.   Neurological: He is alert and oriented to person, place, and time.   Skin: Skin is warm, dry, not diaphoretic and no rash.   Psychiatric: His behavior is " normal.   Vitals reviewed.    Results for orders placed or performed in visit on 07/30/24   SARS Coronavirus 2 Antigen, POCT Manual Read   Result Value Ref Range    SARS Coronavirus 2 Antigen Negative Negative     Acceptable Yes    POCT Influenza A/B MOLECULAR   Result Value Ref Range    POC Molecular Influenza A Ag Negative Negative    POC Molecular Influenza B Ag Negative Negative     Acceptable Yes    POCT Strep A, Molecular   Result Value Ref Range    Molecular Strep A, POC Negative Negative     Acceptable Yes     No results found.     Assessment:     1. Viral illness    2. Fever, unspecified fever cause    3. Cough, unspecified type        Plan:       Viral illness    Fever, unspecified fever cause  -     SARS Coronavirus 2 Antigen, POCT Manual Read  -     POCT Influenza A/B MOLECULAR  -     POCT Strep A, Molecular    Cough, unspecified type  -     promethazine-dextromethorphan (PROMETHAZINE-DM) 6.25-15 mg/5 mL Syrp; Take 5 mLs by mouth every 4 (four) hours as needed (cough).  Dispense: 118 mL; Refill: 0    INSTRUCTIONS  Medication as prescribed. Return in 24hr for repeat testing.

## 2024-08-06 ENCOUNTER — TELEPHONE (OUTPATIENT)
Dept: FAMILY MEDICINE | Facility: CLINIC | Age: 49
End: 2024-08-06
Payer: MEDICARE

## 2024-08-06 RX ORDER — LISINOPRIL 20 MG/1
20 TABLET ORAL DAILY
Qty: 90 TABLET | Refills: 1 | Status: SHIPPED | OUTPATIENT
Start: 2024-08-06 | End: 2025-02-02

## 2024-08-26 RX ORDER — GABAPENTIN 600 MG/1
TABLET ORAL
Qty: 90 TABLET | Refills: 2 | Status: SHIPPED | OUTPATIENT
Start: 2024-08-26

## 2024-08-26 NOTE — TELEPHONE ENCOUNTER
Refill Routing Note   Medication(s) are not appropriate for processing by [facility] for the following reason(s):        Outside of protocol    [facility] action(s):  Route               Appointments  past 12m or future 3m with PCP    Date Provider   Last Visit   6/3/2024 Alexi Nix MD   Next Visit   9/3/2024 Alexi Nix MD   ED visits in past 90 days: 0        Note composed:8:32 AM 08/26/2024

## 2024-08-26 NOTE — TELEPHONE ENCOUNTER
No care due was identified.  [facility] Embedded Care Due Messages. Reference number: [id number].   8/26/2024 8:02:58 AM CDT

## 2024-09-18 ENCOUNTER — OFFICE VISIT (OUTPATIENT)
Dept: FAMILY MEDICINE | Facility: CLINIC | Age: 49
End: 2024-09-18
Payer: MEDICARE

## 2024-09-18 VITALS
HEART RATE: 73 BPM | DIASTOLIC BLOOD PRESSURE: 100 MMHG | HEIGHT: 74 IN | RESPIRATION RATE: 12 BRPM | BODY MASS INDEX: 22.23 KG/M2 | WEIGHT: 173.19 LBS | OXYGEN SATURATION: 98 % | SYSTOLIC BLOOD PRESSURE: 160 MMHG

## 2024-09-18 DIAGNOSIS — F10.982 ALCOHOL-INDUCED INSOMNIA: ICD-10-CM

## 2024-09-18 DIAGNOSIS — Z89.511: ICD-10-CM

## 2024-09-18 DIAGNOSIS — G47.00 INSOMNIA, UNSPECIFIED TYPE: ICD-10-CM

## 2024-09-18 DIAGNOSIS — I10 PRIMARY HYPERTENSION: Primary | ICD-10-CM

## 2024-09-18 PROCEDURE — 99213 OFFICE O/P EST LOW 20 MIN: CPT | Mod: S$GLB,,, | Performed by: INTERNAL MEDICINE

## 2024-09-18 PROCEDURE — 3008F BODY MASS INDEX DOCD: CPT | Mod: CPTII,S$GLB,, | Performed by: INTERNAL MEDICINE

## 2024-09-18 PROCEDURE — 3044F HG A1C LEVEL LT 7.0%: CPT | Mod: CPTII,S$GLB,, | Performed by: INTERNAL MEDICINE

## 2024-09-18 PROCEDURE — 3066F NEPHROPATHY DOC TX: CPT | Mod: CPTII,S$GLB,, | Performed by: INTERNAL MEDICINE

## 2024-09-18 PROCEDURE — 1159F MED LIST DOCD IN RCRD: CPT | Mod: CPTII,S$GLB,, | Performed by: INTERNAL MEDICINE

## 2024-09-18 PROCEDURE — 1160F RVW MEDS BY RX/DR IN RCRD: CPT | Mod: CPTII,S$GLB,, | Performed by: INTERNAL MEDICINE

## 2024-09-18 PROCEDURE — G2211 COMPLEX E/M VISIT ADD ON: HCPCS | Mod: S$GLB,,, | Performed by: INTERNAL MEDICINE

## 2024-09-18 PROCEDURE — 3061F NEG MICROALBUMINURIA REV: CPT | Mod: CPTII,S$GLB,, | Performed by: INTERNAL MEDICINE

## 2024-09-18 PROCEDURE — 4010F ACE/ARB THERAPY RXD/TAKEN: CPT | Mod: CPTII,S$GLB,, | Performed by: INTERNAL MEDICINE

## 2024-09-18 PROCEDURE — 3077F SYST BP >= 140 MM HG: CPT | Mod: CPTII,S$GLB,, | Performed by: INTERNAL MEDICINE

## 2024-09-18 PROCEDURE — 3080F DIAST BP >= 90 MM HG: CPT | Mod: CPTII,S$GLB,, | Performed by: INTERNAL MEDICINE

## 2024-09-18 RX ORDER — AMLODIPINE BESYLATE 10 MG/1
10 TABLET ORAL DAILY
Qty: 30 TABLET | Refills: 2 | Status: SHIPPED | OUTPATIENT
Start: 2024-09-18 | End: 2024-12-17

## 2024-09-18 RX ORDER — TRAZODONE HYDROCHLORIDE 150 MG/1
150 TABLET ORAL NIGHTLY
Qty: 90 TABLET | Refills: 1 | Status: SHIPPED | OUTPATIENT
Start: 2024-09-18 | End: 2025-03-17

## 2024-09-18 NOTE — PROGRESS NOTES
Subjective:       Patient ID: Ayan Marsh is a 49 y.o. male.    Chief Complaint: Follow-up (3 month)      Patient is established already .......... presents today for a f/u visit , to discuss BP    Follow-up  Pertinent negatives include no fever.   Hypertension  This is a chronic problem. The current episode started more than 1 year ago. The problem has been waxing and waning since onset. The problem is uncontrolled. Associated symptoms include anxiety. Agents associated with hypertension include NSAIDs. Risk factors for coronary artery disease include family history, stress, smoking/tobacco exposure, sedentary lifestyle, male gender and dyslipidemia. Past treatments include ACE inhibitors. The current treatment provides no improvement. Compliance problems include medication side effects.      Review of Systems   Constitutional:  Negative for activity change, appetite change and fever.   Respiratory: Negative.     Cardiovascular: Negative.    Gastrointestinal: Negative.    Musculoskeletal: Negative.    Neurological:  Positive for tremors.         Objective:      Physical Exam  Vitals and nursing note reviewed.   Constitutional:       Appearance: Normal appearance.   Cardiovascular:      Rate and Rhythm: Normal rate and regular rhythm.      Pulses: Normal pulses.      Heart sounds: Normal heart sounds. No murmur heard.     No friction rub. No gallop.   Pulmonary:      Effort: Pulmonary effort is normal.      Breath sounds: Normal breath sounds. No wheezing.   Musculoskeletal:      Comments: S/p Right BKA   Skin:     General: Skin is warm and dry.   Neurological:      Mental Status: He is alert.   Psychiatric:         Mood and Affect: Mood normal.         Assessment:       1. Primary hypertension  Overview:  Elevated today    Assessment & Plan:  Change lisinopril to amlodipine      2. Alcohol-induced insomnia  Overview:  Hours of sleep 6      Assessment & Plan:  H/o ETOH abuse  Insomnia   Monitor      3.  Insomnia, unspecified type  Overview:  ETOH related    Assessment & Plan:  Refill trazodone      Other orders  -     amLODIPine (NORVASC) 10 MG tablet; Take 1 tablet (10 mg total) by mouth once daily.  Dispense: 30 tablet; Refill: 2  -     traZODone (DESYREL) 150 MG tablet; Take 1 tablet (150 mg total) by mouth every evening.  Dispense: 90 tablet; Refill: 1           Plan:       1. Primary hypertension  Overview:  Elevated today    Assessment & Plan:  Change lisinopril to amlodipine      2. Alcohol-induced insomnia  Overview:  Hours of sleep 6      Assessment & Plan:  H/o ETOH abuse  Insomnia   Monitor      3. Insomnia, unspecified type  Overview:  ETOH related    Assessment & Plan:  Refill trazodone      Other orders  -     amLODIPine (NORVASC) 10 MG tablet; Take 1 tablet (10 mg total) by mouth once daily.  Dispense: 30 tablet; Refill: 2  -     traZODone (DESYREL) 150 MG tablet; Take 1 tablet (150 mg total) by mouth every evening.  Dispense: 90 tablet; Refill: 1        Visit today included increased complexity associated with the care of the episodic problem.   I addressed and managing the longitudinal care of the patient due to the serious and/or complex managed problem(s).  .    1. Primary hypertension  Overview:  Elevated today    Assessment & Plan:  Change lisinopril to amlodipine      2. Alcohol-induced insomnia  Overview:  Hours of sleep 6      Assessment & Plan:  H/o ETOH abuse  Insomnia   Monitor      3. Insomnia, unspecified type  Overview:  ETOH related    Assessment & Plan:  Refill trazodone      4. Acquired absence of right lower extremity below knee  Overview:  Patient has no comorbidities that will keep him from using the prosthesis  Patient uses the prosthesis daily  Patient is a K3 functional level  Current prosthesis is unsafe due to cracks in the socket and carbon fiber foot is splintering.  Mr. Marsh is a very active individual who must work on his knees, squat, carry items when walking and  walks on uneven terrain regularly.  He cares for himself and performs household chores and grocery shopping.  In you prosthesis we will allow him to perform his normal activities safely and securely    Assessment & Plan:  Does not have any comorbidities that keep him from using a prosthesis  Patient is a strong walker and walks independently with his prosthesis daily  Current socket his cause skin breakdown and limb is so while.  Patient has lost weight and loss of volume in limb.  He needs a socket replacement so that he can continue to perform his normal activities.   He is a K3 mobility level and needs to be able to navigate uneven terrain daily, work outside, mow grass, go shopping, walk long distances , perform household cores and ADLs      Other orders  -     amLODIPine (NORVASC) 10 MG tablet; Take 1 tablet (10 mg total) by mouth once daily.  Dispense: 30 tablet; Refill: 2  -     traZODone (DESYREL) 150 MG tablet; Take 1 tablet (150 mg total) by mouth every evening.  Dispense: 90 tablet; Refill: 1

## 2024-09-20 NOTE — ASSESSMENT & PLAN NOTE
Change lisinopril to amlodipine  
Does not have any comorbidities that keep him from using a prosthesis  Patient is a strong walker and walks independently with his prosthesis daily  Current socket his cause skin breakdown and limb is so while.  Patient has lost weight and loss of volume in limb.  He needs a socket replacement so that he can continue to perform his normal activities.   He is a K3 mobility level and needs to be able to navigate uneven terrain daily, work outside, mow grass, go shopping, walk long distances , perform household cores and ADLs  
H/o ETOH abuse  Insomnia   Monitor  
Refill trazodone  
I have reviewed and confirmed nurses' notes...

## 2024-11-11 ENCOUNTER — OFFICE VISIT (OUTPATIENT)
Dept: FAMILY MEDICINE | Facility: CLINIC | Age: 49
End: 2024-11-11
Payer: MEDICAID

## 2024-11-11 VITALS
OXYGEN SATURATION: 97 % | DIASTOLIC BLOOD PRESSURE: 89 MMHG | BODY MASS INDEX: 21.3 KG/M2 | WEIGHT: 166 LBS | HEIGHT: 74 IN | SYSTOLIC BLOOD PRESSURE: 139 MMHG | HEART RATE: 93 BPM

## 2024-11-11 DIAGNOSIS — T87.89 STUMP PAIN: ICD-10-CM

## 2024-11-11 DIAGNOSIS — G89.29 CHRONIC RIGHT SHOULDER PAIN: ICD-10-CM

## 2024-11-11 DIAGNOSIS — R56.9 WITNESSED SEIZURE-LIKE ACTIVITY: ICD-10-CM

## 2024-11-11 DIAGNOSIS — I10 ESSENTIAL HYPERTENSION, BENIGN: ICD-10-CM

## 2024-11-11 DIAGNOSIS — T40.2X1A POISONING BY OTHER OPIOIDS, ACCIDENTAL (UNINTENTIONAL), INITIAL ENCOUNTER: Primary | ICD-10-CM

## 2024-11-11 DIAGNOSIS — E72.20 SERUM AMMONIA INCREASED: ICD-10-CM

## 2024-11-11 DIAGNOSIS — M79.609 STUMP PAIN: ICD-10-CM

## 2024-11-11 DIAGNOSIS — M25.511 CHRONIC RIGHT SHOULDER PAIN: ICD-10-CM

## 2024-11-11 RX ORDER — GABAPENTIN 800 MG/1
800 TABLET ORAL 3 TIMES DAILY
Qty: 90 TABLET | Refills: 5 | Status: SHIPPED | OUTPATIENT
Start: 2024-11-11 | End: 2025-05-10

## 2024-11-11 RX ORDER — OMEPRAZOLE 40 MG/1
40 CAPSULE, DELAYED RELEASE ORAL
Qty: 90 CAPSULE | Refills: 1 | Status: SHIPPED | OUTPATIENT
Start: 2024-11-11 | End: 2025-05-10

## 2024-11-11 NOTE — ASSESSMENT & PLAN NOTE
Cont norvasc  I gave him some information with his AVS regarding hypertension  Decrease alcohol consumption  Recheck blood pressure in 3M

## 2024-11-11 NOTE — PROGRESS NOTES
Subjective:       Patient ID: Ayan Marsh is a 49 y.o. male.    Chief Complaint: Medication Refill (Patient is here for a medication refill. )      History of Present Illness    CHIEF COMPLAINT:  Ayan presents for medication refills and to have his blood pressure checked.    HPI:  Ayan reports adherence to his prescribed amlodipine regimen for hypertension. He notes significant improvement in his breathing since starting the medication. Ayan takes his medication every morning before work but missed his dose approximately 1 hour prior to the appointment. He has been monitoring his blood pressure at home. Ayan reports benefit from gabapentin, which he currently takes twice daily instead of the prescribed thrice-daily use. He takes omeprazole as needed for heartburn, noting it can cause constipation. Ayan discloses a history of accidental opioid overdose 3 years ago, resulting in hospitalization and withdrawal seizures. He reports abstinence from opioids for the past 3 years and denies any current use of opioids or other illicit drugs.    MEDICATIONS:  Ayan is on Amlodipine every morning for blood pressure management. He takes Gabapentin 800 mg 3 times daily and reports benefit from it. He is also on Omeprazole daily for acid reflux/heartburn, taken as needed, though he reports constipation as a side effect. Patient takes Pravastatin daily.    MEDICAL HISTORY:  Ayan has a history of hypertension. He experienced an accidental opioid overdose three years ago. He also had a seizure during opioid withdrawal.    FAMILY HISTORY:  Family history is significant for father who passed away two years ago at the age of 78. His mother is alive and currently 78 years old.    SOCIAL HISTORY:  Ayan works for [employer].         Review of Systems   Constitutional:  Negative for activity change, appetite change and fever.   Respiratory: Negative.     Cardiovascular: Negative.    Gastrointestinal:  Negative.    Musculoskeletal:  Positive for arthralgias, leg pain and myalgias.         Objective:      Physical Exam  Vitals and nursing note reviewed.   Constitutional:       Appearance: Normal appearance.   Cardiovascular:      Rate and Rhythm: Normal rate and regular rhythm.      Pulses: Normal pulses.      Heart sounds: Normal heart sounds. No murmur heard.     No friction rub. No gallop.   Pulmonary:      Effort: Pulmonary effort is normal.      Breath sounds: Normal breath sounds. No wheezing.   Musculoskeletal:      Comments: Status post right BKA  There is a little chip like fracture of the tip of his right stump   Skin:     General: Skin is warm and dry.   Neurological:      Mental Status: He is alert.   Psychiatric:         Mood and Affect: Mood normal.         Assessment:       1. Poisoning by other opioids, accidental (unintentional), initial encounter  Assessment & Plan:  Long time ago  3 years ago  Accidental  Off narcotics      2. Witnessed seizure-like activity  Assessment & Plan:  Opioid withdrawal seizure  Stable for past 3 years      3. Serum ammonia increased  Assessment & Plan:  H/o ETOH abuse  He dec ETOH intake  Monitor      4. Stump pain  Overview:  Status post right BKA    Assessment & Plan:  Referral to Dr. Lillie Kirk from ortho    Orders:  -     Ambulatory referral/consult to Orthopedics; Future; Expected date: 11/18/2024    5. Essential hypertension, benign  Overview:  Improved    Assessment & Plan:  Cont norvasc  I gave him some information with his AVS regarding hypertension  Decrease alcohol consumption  Recheck blood pressure in 3M        6. Chronic right shoulder pain  Overview:  Stable  Pain increased recently  No clear history of trauma    Assessment & Plan:  Referral to ortho      Other orders  -     gabapentin (NEURONTIN) 800 MG tablet; Take 1 tablet (800 mg total) by mouth 3 (three) times daily.  Dispense: 90 tablet; Refill: 5  -     omeprazole (PRILOSEC) 40 MG capsule; Take  "1 capsule (40 mg total) by mouth as needed (acid reflux).  Dispense: 90 capsule; Refill: 1           Plan:       Assessment & Plan    Assessed patient's blood pressure, noting improvement but still elevated at 140/90  Evaluated effectiveness of amlodipine for hypertension management  Considered patient's request to increase gabapentin dosage  Reviewed patient's history of opioid overdose from 3 years ago  Noted patient's improved lifestyle and employment status    HYPERTENSION:  Amlodipine continued for blood pressure management, with refills available until December 17th.  Amlodipine can be taken in the morning or at night, based on patient preference.    ACID REFLUX:  Omeprazole is for acid reflux and should be taken as needed for heartburn.  Omeprazole continued as needed for acid reflux, limited to 30 tablets per month.    NEUROPATHIC PAIN:  Gabapentin increased to 800 mg 3 times daily.    INSOMNIA:  Sleeping medication discontinued.    FOLLOW UP:  Follow up in 3-4 months.  Pharmacy will contact patient for medication refills about a week before December 17th.       Ayan Schneider" was seen today for medication refill.    Diagnoses and all orders for this visit:    Poisoning by other opioids, accidental (unintentional), initial encounter    Witnessed seizure-like activity    Serum ammonia increased    Stump pain  -     Ambulatory referral/consult to Orthopedics; Future    Essential hypertension, benign    Chronic right shoulder pain    Other orders  -     gabapentin (NEURONTIN) 800 MG tablet; Take 1 tablet (800 mg total) by mouth 3 (three) times daily.  -     omeprazole (PRILOSEC) 40 MG capsule; Take 1 capsule (40 mg total) by mouth as needed (acid reflux).                "

## 2024-12-26 RX ORDER — AMLODIPINE BESYLATE 10 MG/1
TABLET ORAL
Qty: 90 TABLET | Refills: 3 | Status: SHIPPED | OUTPATIENT
Start: 2024-12-26

## 2024-12-26 NOTE — TELEPHONE ENCOUNTER
Refill Decision Note   Ayan Marsh  is requesting a refill authorization.  Brief Assessment and Rationale for Refill:  Approve     Medication Therapy Plan:         Comments:     Note composed:3:11 PM 12/26/2024

## 2024-12-26 NOTE — TELEPHONE ENCOUNTER
No care due was identified.  [facility] Embedded Care Due Messages. Reference number: [id number].   12/26/2024 12:18:00 PM CST

## 2025-01-02 ENCOUNTER — OFFICE VISIT (OUTPATIENT)
Dept: FAMILY MEDICINE | Facility: CLINIC | Age: 50
End: 2025-01-02
Payer: MEDICAID

## 2025-01-02 VITALS
DIASTOLIC BLOOD PRESSURE: 88 MMHG | WEIGHT: 170.31 LBS | BODY MASS INDEX: 21.86 KG/M2 | SYSTOLIC BLOOD PRESSURE: 120 MMHG | HEIGHT: 74 IN | OXYGEN SATURATION: 98 % | HEART RATE: 98 BPM

## 2025-01-02 DIAGNOSIS — I10 ESSENTIAL HYPERTENSION, BENIGN: Primary | ICD-10-CM

## 2025-01-02 DIAGNOSIS — Z00.01 ABNORMAL WELLNESS EXAM: ICD-10-CM

## 2025-01-02 DIAGNOSIS — E78.2 MIXED HYPERLIPIDEMIA: ICD-10-CM

## 2025-01-02 DIAGNOSIS — N40.0 BENIGN PROSTATIC HYPERPLASIA WITHOUT LOWER URINARY TRACT SYMPTOMS: ICD-10-CM

## 2025-01-02 DIAGNOSIS — G62.9 PERIPHERAL POLYNEUROPATHY: ICD-10-CM

## 2025-01-02 DIAGNOSIS — M25.511 CHRONIC RIGHT SHOULDER PAIN: ICD-10-CM

## 2025-01-02 DIAGNOSIS — G89.29 CHRONIC RIGHT SHOULDER PAIN: ICD-10-CM

## 2025-01-02 DIAGNOSIS — K21.9 GASTROESOPHAGEAL REFLUX DISEASE WITHOUT ESOPHAGITIS: ICD-10-CM

## 2025-01-02 RX ORDER — AMLODIPINE BESYLATE 10 MG/1
10 TABLET ORAL DAILY
Qty: 90 TABLET | Refills: 1 | Status: SHIPPED | OUTPATIENT
Start: 2025-01-02 | End: 2025-07-01

## 2025-01-02 RX ORDER — GABAPENTIN 800 MG/1
800 TABLET ORAL 3 TIMES DAILY
Qty: 90 TABLET | Refills: 5 | Status: SHIPPED | OUTPATIENT
Start: 2025-01-02 | End: 2025-07-01

## 2025-01-02 RX ORDER — OMEPRAZOLE 40 MG/1
40 CAPSULE, DELAYED RELEASE ORAL
Qty: 90 CAPSULE | Refills: 1 | Status: SHIPPED | OUTPATIENT
Start: 2025-01-02 | End: 2025-07-01

## 2025-01-02 NOTE — PROGRESS NOTES
Subjective:       Patient ID: Ayan Marsh is a 49 y.o. male.    Chief Complaint: Medication Refill (Patient is here for medication refill.) and Annual Exam      History of Present Illness    CHIEF COMPLAINT:  Ayan presents today for medication refills and to clarify a misunderstanding about being transferred to another physician's care.    HPI:  Ayan initially presented with concerns about paperwork indicating a transfer to another physician's care, which was determined to be a misunderstanding. He reports shoulder pain, for which he was referred to Dr. Lillie Kirk, an orthopedic specialist. Ayan has undergone x-rays of his shoulder and is scheduled for an MRI on January 26th, potentially followed by an injection. His shoulder and leg had significant weakness approximately 4 months ago, leading to the referral. Ayan's blood pressure, previously elevated at 205/18, has improved with medication to 125/88. Previous liver function tests showed slightly elevated enzymes, necessitating a follow-up. Ayan also requires a refill for his gabapentin, which is due on Monday.    MEDICATIONS:  Ayan is on Gabapentin 800 mg 3-4 times daily. He is also taking Norvasc (Amlodipine) 10 mg daily for blood pressure control. He is on Omeprazole 40 mg daily as needed.    MEDICAL HISTORY:  Ayan has a history of hypertension. His blood pressure was previously severely elevated but is now controlled with medication.    TEST RESULTS:  Ayan had a liver function test in the past which showed elevated enzymes. A follow-up is needed.    IMAGING:  Ayan recently underwent a shoulder X-ray. He has an MRI of the shoulder scheduled for January 26th.         Review of Systems   Constitutional:  Negative for activity change, appetite change and fever.   Respiratory: Negative.     Cardiovascular: Negative.    Gastrointestinal: Negative.    Musculoskeletal:  Positive for arthralgias.         Objective:      Physical  Exam  Vitals and nursing note reviewed.   Constitutional:       Appearance: Normal appearance.   Cardiovascular:      Rate and Rhythm: Normal rate and regular rhythm.      Pulses: Normal pulses.      Heart sounds: Normal heart sounds. No murmur heard.     No friction rub. No gallop.   Pulmonary:      Effort: Pulmonary effort is normal.      Breath sounds: Normal breath sounds. No wheezing.   Musculoskeletal:      Comments: S/p L BKA   Skin:     General: Skin is warm and dry.   Neurological:      Mental Status: He is alert.   Psychiatric:         Mood and Affect: Mood normal.         Assessment:       1. Essential hypertension, benign  Overview:  Improved    Assessment & Plan:  Refill norvasc    Orders:  -     Comprehensive Metabolic Panel; Future; Expected date: 01/02/2025  -     Microalbumin/Creatinine Ratio, Urine; Future; Expected date: 01/02/2025    2. Mixed hyperlipidemia  -     Lipid Panel; Future; Expected date: 01/02/2025    3. Benign prostatic hyperplasia without lower urinary tract symptoms  -     Prostate Specific Antigen, Diagnostic; Future; Expected date: 01/02/2025    4. Abnormal wellness exam  Overview:  Patient presents for a wellness visit  No new c/o today  Needs refills  Please refer to initial intake notes for screening/preventive measures  All histories and immunization schedule reviewed with patient      Assessment & Plan:  I gave the patient verbal recommendations re the outstanding vaccinations.  We discussed smoking , ETOH briefly      5. Peripheral polyneuropathy  Overview:  Stable    Assessment & Plan:  Refill gabapentin      6. Gastroesophageal reflux disease without esophagitis  Overview:  Stable    Assessment & Plan:  Take PPI prn only  Dec ETOH, smoking      7. Chronic right shoulder pain  Overview:  Stable  Pain increased recently  No clear history of trauma    Assessment & Plan:  See Dr ROCK Kirk  MRI scheduled      Other orders  -     gabapentin (NEURONTIN) 800 MG tablet; Take 1  tablet (800 mg total) by mouth 3 (three) times daily.  Dispense: 90 tablet; Refill: 5  -     amLODIPine (NORVASC) 10 MG tablet; Take 1 tablet (10 mg total) by mouth once daily.  Dispense: 90 tablet; Refill: 1  -     omeprazole (PRILOSEC) 40 MG capsule; Take 1 capsule (40 mg total) by mouth as needed (acid reflux).  Dispense: 90 capsule; Refill: 1           Plan:       Assessment & Plan    IMPRESSION:  - Reviewed patient's blood pressure, noting improvement from previous elevated readings (125/88 vs. 205/118)  - Noted slightly elevated liver function enzymes, plan to recheck  - Evaluated need for medication refills, including gabapentin and Norvasc    SHOULDER PAIN:  - Referred the patient to an orthopedic specialist for evaluation of shoulder pain.  - Ordered x-rays of the shoulder and scheduled an MRI to check for potential tears.  - Planned an injection for January 26th, pending MRI results.  - Instructed the patient to follow up with the orthopedic specialist for treatment planning based on evaluation results.    HYPERTENSION:  - Measured blood pressure at 125/88, noting an improvement from previous high readings.  - Continued Norvasc 10 mg daily for blood pressure management.  - Educated the patient about the importance of medication adherence for blood pressure control.  - Advised the patient to maintain a low-salt diet.  - Noted that the patient reports blood pressure has been well-controlled.  - Discussed importance of limiting salt intake for blood pressure management.  - Ayan to continue monitoring salt intake and avoid excessive fast food consumption.    ELEVATED LIVER ENZYMES:  - Noted previously elevated liver enzymes.  - Ordered labs to recheck liver function and other routine labs.  - Scheduled follow up in 1 week for labs results.    INFLUENZA VACCINATION:  - Explained the high prevalence of current influenza cases and the importance of vaccination.  - Recommend influenza vaccination due to the  "high incidence of cases.  - Noted that the patient declined the influenza vaccine at this time.  - Advised to reconsider influenza vaccination at the next visit.    MEDICATIONS/SUPPLEMENTS:  - Continued gabapentin 800 mg 3-4 times daily, providing a 6-month supply.  - Refilled omeprazole, to be taken as needed.       Ayan Schneider" was seen today for medication refill and annual exam.    Diagnoses and all orders for this visit:    Essential hypertension, benign  -     Comprehensive Metabolic Panel; Future  -     Microalbumin/Creatinine Ratio, Urine; Future    Mixed hyperlipidemia  -     Lipid Panel; Future    Benign prostatic hyperplasia without lower urinary tract symptoms  -     Prostate Specific Antigen, Diagnostic; Future    Abnormal wellness exam    Peripheral polyneuropathy    Gastroesophageal reflux disease without esophagitis    Chronic right shoulder pain    Other orders  -     gabapentin (NEURONTIN) 800 MG tablet; Take 1 tablet (800 mg total) by mouth 3 (three) times daily.  -     amLODIPine (NORVASC) 10 MG tablet; Take 1 tablet (10 mg total) by mouth once daily.  -     omeprazole (PRILOSEC) 40 MG capsule; Take 1 capsule (40 mg total) by mouth as needed (acid reflux).                "

## 2025-01-02 NOTE — ASSESSMENT & PLAN NOTE
I gave the patient verbal recommendations re the outstanding vaccinations.  We discussed smoking , ETOH briefly

## 2025-01-10 ENCOUNTER — LAB VISIT (OUTPATIENT)
Dept: LAB | Facility: HOSPITAL | Age: 50
End: 2025-01-10
Attending: INTERNAL MEDICINE
Payer: MEDICARE

## 2025-01-10 ENCOUNTER — PATIENT MESSAGE (OUTPATIENT)
Dept: FAMILY MEDICINE | Facility: CLINIC | Age: 50
End: 2025-01-10
Payer: MEDICARE

## 2025-01-10 ENCOUNTER — TELEPHONE (OUTPATIENT)
Dept: FAMILY MEDICINE | Facility: CLINIC | Age: 50
End: 2025-01-10
Payer: MEDICARE

## 2025-01-10 DIAGNOSIS — N40.0 BENIGN PROSTATIC HYPERPLASIA WITHOUT LOWER URINARY TRACT SYMPTOMS: ICD-10-CM

## 2025-01-10 DIAGNOSIS — I10 ESSENTIAL HYPERTENSION, BENIGN: ICD-10-CM

## 2025-01-10 DIAGNOSIS — E78.2 MIXED HYPERLIPIDEMIA: ICD-10-CM

## 2025-01-10 LAB
ALBUMIN SERPL BCP-MCNC: 3.9 G/DL (ref 3.5–5.2)
ALBUMIN/CREAT UR: 153.3 UG/MG (ref 0–30)
ALP SERPL-CCNC: 162 U/L (ref 40–150)
ALT SERPL W/O P-5'-P-CCNC: 52 U/L (ref 10–44)
ANION GAP SERPL CALC-SCNC: 14 MMOL/L (ref 8–16)
AST SERPL-CCNC: 125 U/L (ref 10–40)
BILIRUB SERPL-MCNC: 0.5 MG/DL (ref 0.1–1)
BUN SERPL-MCNC: 7 MG/DL (ref 6–20)
CALCIUM SERPL-MCNC: 9.1 MG/DL (ref 8.7–10.5)
CHLORIDE SERPL-SCNC: 100 MMOL/L (ref 95–110)
CHOLEST SERPL-MCNC: 203 MG/DL (ref 120–199)
CHOLEST/HDLC SERPL: 3.6 {RATIO} (ref 2–5)
CO2 SERPL-SCNC: 32 MMOL/L (ref 23–29)
COMPLEXED PSA SERPL-MCNC: 4.1 NG/ML (ref 0–4)
CREAT SERPL-MCNC: 1 MG/DL (ref 0.5–1.4)
CREAT UR-MCNC: 92 MG/DL (ref 23–375)
EST. GFR  (NO RACE VARIABLE): >60 ML/MIN/1.73 M^2
GLUCOSE SERPL-MCNC: 127 MG/DL (ref 70–110)
HDLC SERPL-MCNC: 56 MG/DL (ref 40–75)
HDLC SERPL: 27.6 % (ref 20–50)
LDLC SERPL CALC-MCNC: 110 MG/DL (ref 63–159)
MICROALBUMIN UR DL<=1MG/L-MCNC: 141 UG/ML
NONHDLC SERPL-MCNC: 147 MG/DL
POTASSIUM SERPL-SCNC: 2.7 MMOL/L (ref 3.5–5.1)
PROT SERPL-MCNC: 7.6 G/DL (ref 6–8.4)
SODIUM SERPL-SCNC: 146 MMOL/L (ref 136–145)
TRIGL SERPL-MCNC: 185 MG/DL (ref 30–150)

## 2025-01-10 PROCEDURE — 80061 LIPID PANEL: CPT | Performed by: INTERNAL MEDICINE

## 2025-01-10 PROCEDURE — 84153 ASSAY OF PSA TOTAL: CPT | Performed by: INTERNAL MEDICINE

## 2025-01-10 PROCEDURE — 80053 COMPREHEN METABOLIC PANEL: CPT | Performed by: INTERNAL MEDICINE

## 2025-01-10 PROCEDURE — 36415 COLL VENOUS BLD VENIPUNCTURE: CPT | Performed by: INTERNAL MEDICINE

## 2025-01-10 PROCEDURE — 82043 UR ALBUMIN QUANTITATIVE: CPT | Performed by: INTERNAL MEDICINE

## 2025-01-10 NOTE — TELEPHONE ENCOUNTER
I tried to call the patient to or 3 times a day regarding his critical potassium  He did not answer the phone  I even tried his mom's phone but she did not  They have no voice message set up to leave a message  His potassium is critically low and I was going to give him advice to head to the emergency room to be monitored and to repeat his potassium level  Last phone call was done at 4:50 p.m. today on Friday January 10  I sent a message to our staff to reach out to him by phone or letter    DESIREE Jackson MD

## 2025-01-10 NOTE — TELEPHONE ENCOUNTER
----- Message from Alexi Nix MD sent at 1/10/2025  4:52 PM CST -----  Regarding: Critical potassium level  I called the patient's several times but there was no answer and no voice message system to leave a message  Can you please send him a letter regarding his critical potassium and that he has to call us ASAP or head to the nearest ER and tell them that his potassium was 2.7  Thank you  ----- Message -----  From: [employer] [facility]  Sent: 1/10/2025   9:38 AM CST  To: Alexi Nix MD

## 2025-01-10 NOTE — TELEPHONE ENCOUNTER
Tried to call patient and no answer and no voice mail.  Sent patient a message through portal and let PCP know the number for Police non-emergent line to do a wellcheck.

## 2025-01-13 ENCOUNTER — HOSPITAL ENCOUNTER (EMERGENCY)
Facility: HOSPITAL | Age: 50
Discharge: HOME | End: 2025-01-13
Attending: EMERGENCY MEDICINE
Payer: MEDICARE

## 2025-01-13 VITALS
WEIGHT: 180 LBS | HEIGHT: 74 IN | TEMPERATURE: 98 F | OXYGEN SATURATION: 94 % | BODY MASS INDEX: 23.1 KG/M2 | RESPIRATION RATE: 12 BRPM | SYSTOLIC BLOOD PRESSURE: 134 MMHG | HEART RATE: 85 BPM | DIASTOLIC BLOOD PRESSURE: 92 MMHG

## 2025-01-13 DIAGNOSIS — F10.90 ALCOHOL USE DISORDER: ICD-10-CM

## 2025-01-13 DIAGNOSIS — E83.42 HYPOMAGNESEMIA: Primary | ICD-10-CM

## 2025-01-13 DIAGNOSIS — E87.6 HYPOKALEMIA: ICD-10-CM

## 2025-01-13 LAB
ALBUMIN SERPL BCP-MCNC: 3.9 G/DL (ref 3.5–5.2)
ALP SERPL-CCNC: 158 U/L (ref 40–150)
ALT SERPL W/O P-5'-P-CCNC: 43 U/L (ref 10–44)
ANION GAP SERPL CALC-SCNC: 13 MMOL/L (ref 8–16)
AST SERPL-CCNC: 107 U/L (ref 10–40)
BASOPHILS # BLD AUTO: 0.08 K/UL (ref 0–0.2)
BASOPHILS NFR BLD: 1 % (ref 0–1.9)
BILIRUB SERPL-MCNC: 0.5 MG/DL (ref 0.1–1)
BUN SERPL-MCNC: 5 MG/DL (ref 6–20)
CALCIUM SERPL-MCNC: 8.8 MG/DL (ref 8.7–10.5)
CHLORIDE SERPL-SCNC: 100 MMOL/L (ref 95–110)
CO2 SERPL-SCNC: 27 MMOL/L (ref 23–29)
CREAT SERPL-MCNC: 0.9 MG/DL (ref 0.5–1.4)
DIFFERENTIAL METHOD BLD: ABNORMAL
EOSINOPHIL # BLD AUTO: 0.1 K/UL (ref 0–0.5)
EOSINOPHIL NFR BLD: 1.8 % (ref 0–8)
ERYTHROCYTE [DISTWIDTH] IN BLOOD BY AUTOMATED COUNT: 13.2 % (ref 11.5–14.5)
EST. GFR  (NO RACE VARIABLE): >60 ML/MIN/1.73 M^2
ETHANOL SERPL-MCNC: 119 MG/DL (ref 0–10)
GLUCOSE SERPL-MCNC: 133 MG/DL (ref 70–110)
HCT VFR BLD AUTO: 42 % (ref 40–54)
HCV AB SERPL QL IA: NORMAL
HGB BLD-MCNC: 14.6 G/DL (ref 14–18)
HIV 1+2 AB+HIV1 P24 AG SERPL QL IA: NORMAL
IMM GRANULOCYTES # BLD AUTO: 0.04 K/UL (ref 0–0.04)
IMM GRANULOCYTES NFR BLD AUTO: 0.5 % (ref 0–0.5)
LYMPHOCYTES # BLD AUTO: 2 K/UL (ref 1–4.8)
LYMPHOCYTES NFR BLD: 25.7 % (ref 18–48)
MAGNESIUM SERPL-MCNC: 1.5 MG/DL (ref 1.6–2.6)
MCH RBC QN AUTO: 35.8 PG (ref 27–31)
MCHC RBC AUTO-ENTMCNC: 34.8 G/DL (ref 32–36)
MCV RBC AUTO: 103 FL (ref 82–98)
MONOCYTES # BLD AUTO: 0.7 K/UL (ref 0.3–1)
MONOCYTES NFR BLD: 8.5 % (ref 4–15)
NEUTROPHILS # BLD AUTO: 4.8 K/UL (ref 1.8–7.7)
NEUTROPHILS NFR BLD: 62.5 % (ref 38–73)
NRBC BLD-RTO: 0 /100 WBC
PLATELET # BLD AUTO: 148 K/UL (ref 150–450)
PMV BLD AUTO: 10.4 FL (ref 9.2–12.9)
POTASSIUM SERPL-SCNC: 3 MMOL/L (ref 3.5–5.1)
PROT SERPL-MCNC: 7.5 G/DL (ref 6–8.4)
RBC # BLD AUTO: 4.08 M/UL (ref 4.6–6.2)
SODIUM SERPL-SCNC: 140 MMOL/L (ref 136–145)
WBC # BLD AUTO: 7.67 K/UL (ref 3.9–12.7)

## 2025-01-13 PROCEDURE — 36415 COLL VENOUS BLD VENIPUNCTURE: CPT | Performed by: EMERGENCY MEDICINE

## 2025-01-13 PROCEDURE — 99284 EMERGENCY DEPT VISIT MOD MDM: CPT | Mod: 25

## 2025-01-13 PROCEDURE — 87389 HIV-1 AG W/HIV-1&-2 AB AG IA: CPT | Performed by: EMERGENCY MEDICINE

## 2025-01-13 PROCEDURE — 85025 COMPLETE CBC W/AUTO DIFF WBC: CPT | Performed by: EMERGENCY MEDICINE

## 2025-01-13 PROCEDURE — 86803 HEPATITIS C AB TEST: CPT | Performed by: EMERGENCY MEDICINE

## 2025-01-13 PROCEDURE — 96365 THER/PROPH/DIAG IV INF INIT: CPT

## 2025-01-13 PROCEDURE — 82077 ASSAY SPEC XCP UR&BREATH IA: CPT | Performed by: EMERGENCY MEDICINE

## 2025-01-13 PROCEDURE — 80053 COMPREHEN METABOLIC PANEL: CPT | Performed by: EMERGENCY MEDICINE

## 2025-01-13 PROCEDURE — 96361 HYDRATE IV INFUSION ADD-ON: CPT

## 2025-01-13 PROCEDURE — 83735 ASSAY OF MAGNESIUM: CPT | Performed by: EMERGENCY MEDICINE

## 2025-01-13 PROCEDURE — 63600175 PHARM REV CODE 636 W HCPCS: Performed by: EMERGENCY MEDICINE

## 2025-01-13 PROCEDURE — 25000003 PHARM REV CODE 250: Performed by: EMERGENCY MEDICINE

## 2025-01-13 RX ORDER — POTASSIUM CHLORIDE 20 MEQ/1
40 TABLET, EXTENDED RELEASE ORAL
Status: COMPLETED | OUTPATIENT
Start: 2025-01-13 | End: 2025-01-13

## 2025-01-13 RX ORDER — MAGNESIUM SULFATE HEPTAHYDRATE 40 MG/ML
2 INJECTION, SOLUTION INTRAVENOUS
Status: COMPLETED | OUTPATIENT
Start: 2025-01-13 | End: 2025-01-13

## 2025-01-13 RX ADMIN — POTASSIUM CHLORIDE 40 MEQ: 1500 TABLET, EXTENDED RELEASE ORAL at 02:01

## 2025-01-13 RX ADMIN — SODIUM CHLORIDE, POTASSIUM CHLORIDE, SODIUM LACTATE AND CALCIUM CHLORIDE 1000 ML: 600; 310; 30; 20 INJECTION, SOLUTION INTRAVENOUS at 01:01

## 2025-01-13 RX ADMIN — MAGNESIUM SULFATE HEPTAHYDRATE 2 G: 40 INJECTION, SOLUTION INTRAVENOUS at 02:01

## 2025-01-13 NOTE — ED PROVIDER NOTES
"   History     Chief Complaint   Patient presents with    Abnormal Labs     Called by PMD to go to ED for K 2.7. Reports ETOH abuse and daily vomiting. C/o fatigue and body aches.     HPI:  Ayan Marsh is a 49 y.o. male with PMH as below who presents to the [facility] emergency department for evaluation of fatigue, body aches, vomiting, in the setting of drinking vodka and orange juice heavily on a daily basis. He's had decreased intake of regular food. He had K of 2.7 on recent outpatient labs. He has no other complaints.     PCP: Alexi Nix MD    Review of patient's allergies indicates:   Allergen Reactions    Opioids - morphine analogues Other (See Comments)     "I go nuts"      Past Medical History:   Diagnosis Date    Fractures     Hypertension      Past Surgical History:   Procedure Laterality Date    CERVICAL FUSION      HAND SURGERY      LEG AMPUTATION      Right    LEG SURGERY      NECK SURGERY         Family History   Problem Relation Name Age of Onset    Hypertension Mother      Hypertension Father       Social History     Tobacco Use    Smoking status: Every Day     Current packs/day: 1.00     Average packs/day: 1 pack/day for 32.4 years (32.4 ttl pk-yrs)     Types: Cigarettes     Start date: 8/20/1992     Passive exposure: Current    Smokeless tobacco: Never   Substance and Sexual Activity    Alcohol use: Yes     Alcohol/week: 4.0 standard drinks of alcohol     Types: 4 Cans of beer per week     Comment: 4 drinks a day    Drug use: Not Currently     Types: Marijuana, Methamphetamines     Comment: last use 7th of january    Sexual activity: Not Currently      Review of Systems     Review of Systems   Constitutional: Negative.    HENT: Negative.     Eyes: Negative.    Respiratory: Negative.     Cardiovascular: Negative.    Gastrointestinal: Negative.         Dysphagia   Endocrine: Negative.    Genitourinary: Negative.    Musculoskeletal: Negative.    Skin: Negative.  " "  Allergic/Immunologic: Negative.    Neurological: Negative.    Hematological: Negative.    Psychiatric/Behavioral: Negative.     All other systems reviewed and are negative.       Physical Exam     Initial Vitals [01/13/25 1205]   BP Pulse Resp Temp SpO2   (!) 168/108 90 20 97.8 °F (36.6 °C) 95 %      MAP       --          Nursing notes and vital signs reviewed.  Constitutional: Patient is in no acute distress.   Head: Normocephalic. Atraumatic.   Eyes:  Conjunctivae are not pale. No scleral icterus.   ENT: Mucous membranes moist.   Neck: Supple.   Cardiovascular: Regular rate. Regular rhythm.   Pulmonary: No respiratory distress.   Abdominal: Non-distended.   Musculoskeletal: Moves all extremities. No obvious deformities.   Skin: Warm and dry.   Neurological:  Alert, awake. Normal speech. No acute lateralizing neurologic deficits appreciated.   Psychiatric: Normal affect.       ED Course   Procedures  Vitals:    01/13/25 1205   BP: (!) 168/108   Pulse: 90   Resp: 20   Temp: 97.8 °F (36.6 °C)   TempSrc: Oral   SpO2: 95%   Weight: 81.6 kg (180 lb)   Height: 6' 2" (1.88 m)     Lab Results Interpreted as Abnormal:  Labs Reviewed   CBC W/ AUTO DIFFERENTIAL - Abnormal       Result Value    WBC 7.67      RBC 4.08 (*)     Hemoglobin 14.6      Hematocrit 42.0       (*)     MCH 35.8 (*)     MCHC 34.8      RDW 13.2      Platelets 148 (*)     MPV 10.4      Immature Granulocytes 0.5      Gran # (ANC) 4.8      Immature Grans (Abs) 0.04      Lymph # 2.0      Mono # 0.7      Eos # 0.1      Baso # 0.08      nRBC 0      Gran % 62.5      Lymph % 25.7      Mono % 8.5      Eosinophil % 1.8      Basophil % 1.0      Differential Method Automated     COMPREHENSIVE METABOLIC PANEL - Abnormal    Sodium 140      Potassium 3.0 (*)     Chloride 100      CO2 27      Glucose 133 (*)     BUN 5 (*)     Creatinine 0.9      Calcium 8.8      Total Protein 7.5      Albumin 3.9      Total Bilirubin 0.5      Alkaline Phosphatase 158 (*)     AST " 107 (*)     ALT 43      eGFR >60.0      Anion Gap 13     MAGNESIUM - Abnormal    Magnesium 1.5 (*)    ALCOHOL,MEDICAL (ETHANOL) - Abnormal    Alcohol, Serum 119 (*)     Narrative:     Release to patient->Immediate   HEPATITIS C ANTIBODY   HIV 1 / 2 ANTIBODY      All Lab Results:  Results for orders placed or performed during the hospital encounter of 01/13/25   CBC auto differential    Collection Time: 01/13/25  1:20 PM   Result Value Ref Range    WBC 7.67 3.90 - 12.70 K/uL    RBC 4.08 (L) 4.60 - 6.20 M/uL    Hemoglobin 14.6 14.0 - 18.0 g/dL    Hematocrit 42.0 40.0 - 54.0 %     (H) 82 - 98 fL    MCH 35.8 (H) 27.0 - 31.0 pg    MCHC 34.8 32.0 - 36.0 g/dL    RDW 13.2 11.5 - 14.5 %    Platelets 148 (L) 150 - 450 K/uL    MPV 10.4 9.2 - 12.9 fL    Immature Granulocytes 0.5 0.0 - 0.5 %    Gran # (ANC) 4.8 1.8 - 7.7 K/uL    Immature Grans (Abs) 0.04 0.00 - 0.04 K/uL    Lymph # 2.0 1.0 - 4.8 K/uL    Mono # 0.7 0.3 - 1.0 K/uL    Eos # 0.1 0.0 - 0.5 K/uL    Baso # 0.08 0.00 - 0.20 K/uL    nRBC 0 0 /100 WBC    Gran % 62.5 38.0 - 73.0 %    Lymph % 25.7 18.0 - 48.0 %    Mono % 8.5 4.0 - 15.0 %    Eosinophil % 1.8 0.0 - 8.0 %    Basophil % 1.0 0.0 - 1.9 %    Differential Method Automated    Comprehensive metabolic panel    Collection Time: 01/13/25  1:20 PM   Result Value Ref Range    Sodium 140 136 - 145 mmol/L    Potassium 3.0 (L) 3.5 - 5.1 mmol/L    Chloride 100 95 - 110 mmol/L    CO2 27 23 - 29 mmol/L    Glucose 133 (H) 70 - 110 mg/dL    BUN 5 (L) 6 - 20 mg/dL    Creatinine 0.9 0.5 - 1.4 mg/dL    Calcium 8.8 8.7 - 10.5 mg/dL    Total Protein 7.5 6.0 - 8.4 g/dL    Albumin 3.9 3.5 - 5.2 g/dL    Total Bilirubin 0.5 0.1 - 1.0 mg/dL    Alkaline Phosphatase 158 (H) 40 - 150 U/L     (H) 10 - 40 U/L    ALT 43 10 - 44 U/L    eGFR >60.0 >60 mL/min/1.73 m^2    Anion Gap 13 8 - 16 mmol/L   Magnesium    Collection Time: 01/13/25  1:20 PM   Result Value Ref Range    Magnesium 1.5 (L) 1.6 - 2.6 mg/dL   Ethanol    Collection  Time: 01/13/25  1:20 PM   Result Value Ref Range    Alcohol, Serum 119 (H) 0 - 10 mg/dL     Imaging Results    None          The emergency physician reviewed the vital signs / test results outlined above.     ED Discussion      Discussed EtOH cessation and diet to increase K+ and Mg++. Follow up with PCP.     Patient's evaluation in the ED does not suggest any emergent or life-threatening medical conditions requiring immediate intervention beyond what was provided in the ED, and I believe patient is safe for discharge. Regardless, an unremarkable evaluation in the ED does not preclude the development or presence of a serious or life-threatening condition. As such, patient was given return instructions for any change or worsening of symptoms.       ED Medication(s) Administered:  Medications   lactated ringers bolus 1,000 mL (1,000 mLs Intravenous New Bag 1/13/25 1344)   potassium chloride SA CR tablet 40 mEq (has no administration in time range)   magnesium sulfate 2g in water 50mL IVPB (premix) (2 g Intravenous New Bag 1/13/25 1430)       Prescription Management: I performed a review of the patient's current Rx medication list as input by nursing staff.    Patient's Medications   New Prescriptions    No medications on file   Previous Medications    AMLODIPINE (NORVASC) 10 MG TABLET    Take 1 tablet (10 mg total) by mouth once daily.    GABAPENTIN (NEURONTIN) 800 MG TABLET    Take 1 tablet (800 mg total) by mouth 3 (three) times daily.    OMEPRAZOLE (PRILOSEC) 40 MG CAPSULE    Take 1 capsule (40 mg total) by mouth as needed (acid reflux).   Modified Medications    No medications on file   Discontinued Medications    No medications on file         Follow-up Information       Schedule an appointment as soon as possible for a visit  with Alexi Nix MD.    Specialty: Internal Medicine  Contact information:  [address] [address]  [place] [address]  [phone]               [facility] Emergency Dept.     Specialty: Emergency Medicine  Why: As needed, If symptoms worsen  Contact information:  [address] [facility] [address] [phone]                          Clinical Impression       ICD-10-CM ICD-9-CM   1. Hypomagnesemia  E83.42 275.2   2. Hypokalemia  E87.6 276.8   3. Alcohol use disorder  F10.90 V49.89      ED Disposition Condition    Discharge Stable             Byron Beard MD  01/13/25 9649

## 2025-01-13 NOTE — DISCHARGE INSTRUCTIONS
Eat bananas & strawberries daily to replenish potassium    Eat dark leafy greens to replenish your magnesium    It is critical to stop drinking alcohol    Alcohol Resources 2024                            AA meetings and recovery     Alcoholics Anonymous  Website: www.aa.org  Phone: 423.128.3153  Services: Provides 24 hour crisis line and referrals to treatment groups in the community.     [facility]  Phone: [phone]

## 2025-02-02 ENCOUNTER — HOSPITAL ENCOUNTER (EMERGENCY)
Facility: HOSPITAL | Age: 50
Discharge: HOME | End: 2025-02-02
Attending: STUDENT IN AN ORGANIZED HEALTH CARE EDUCATION/TRAINING PROGRAM
Payer: MEDICARE

## 2025-02-02 VITALS
SYSTOLIC BLOOD PRESSURE: 155 MMHG | WEIGHT: 180 LBS | OXYGEN SATURATION: 96 % | RESPIRATION RATE: 20 BRPM | HEIGHT: 74 IN | TEMPERATURE: 98 F | HEART RATE: 88 BPM | BODY MASS INDEX: 23.1 KG/M2 | DIASTOLIC BLOOD PRESSURE: 97 MMHG

## 2025-02-02 DIAGNOSIS — M54.12 CERVICAL RADICULOPATHY: Primary | ICD-10-CM

## 2025-02-02 DIAGNOSIS — M54.2 ACUTE NECK PAIN: ICD-10-CM

## 2025-02-02 DIAGNOSIS — Z98.890 HISTORY OF NECK SURGERY: ICD-10-CM

## 2025-02-02 PROCEDURE — 72040 X-RAY EXAM NECK SPINE 2-3 VW: CPT | Mod: TC

## 2025-02-02 PROCEDURE — 99283 EMERGENCY DEPT VISIT LOW MDM: CPT | Mod: 25

## 2025-02-02 PROCEDURE — 72040 X-RAY EXAM NECK SPINE 2-3 VW: CPT | Mod: 26,,, | Performed by: RADIOLOGY

## 2025-02-03 NOTE — ED PROVIDER NOTES
"CHIEF COMPLAINT  Chief Complaint   Patient presents with    Neck Pain     L side neck pain since waking this am. Hx of neck problems.       HISTORY OF PRESENT ILLNESS  Ayan Marsh is a 49 y.o. male with PMH cervical surgery in 1996 who presents to ER for evaluation of neck pain, intermittent numbness on arms since he used tiller with big vibration. He wanted to make sure his surgical site did not get affected.  No other specific aggravating or relieving factors otherwise.      PAST MEDICAL HISTORY  Past Medical History:   Diagnosis Date    Fractures     Hypertension        CURRENT MEDICATIONS    No current facility-administered medications for this encounter.    Current Outpatient Medications:     amLODIPine (NORVASC) 10 MG tablet, Take 1 tablet (10 mg total) by mouth once daily., Disp: 90 tablet, Rfl: 1    gabapentin (NEURONTIN) 800 MG tablet, Take 1 tablet (800 mg total) by mouth 3 (three) times daily., Disp: 90 tablet, Rfl: 5    omeprazole (PRILOSEC) 40 MG capsule, Take 1 capsule (40 mg total) by mouth as needed (acid reflux)., Disp: 90 capsule, Rfl: 1    ALLERGIES    Review of patient's allergies indicates:   Allergen Reactions    Opioids - morphine analogues Other (See Comments)     "I go nuts"       SURGICAL HISTORY    Past Surgical History:   Procedure Laterality Date    CERVICAL FUSION      HAND SURGERY      LEG AMPUTATION      Right    LEG SURGERY      NECK SURGERY         SOCIAL HISTORY    Social History     Socioeconomic History    Marital status: Single   Tobacco Use    Smoking status: Every Day     Current packs/day: 1.00     Average packs/day: 1 pack/day for 32.5 years (32.5 ttl pk-yrs)     Types: Cigarettes     Start date: 8/20/1992     Passive exposure: Current    Smokeless tobacco: Never   Substance and Sexual Activity    Alcohol use: Yes     Alcohol/week: 4.0 standard drinks of alcohol     Types: 4 Cans of beer per week     Comment: 4 drinks a day    Drug use: Not Currently     Types: " "Marijuana, Methamphetamines     Comment: last use 7th of january    Sexual activity: Not Currently     Social Drivers of Health     Physical Activity: Insufficiently Active (2/27/2024)    Exercise Vital Sign     Days of Exercise per Week: 3 days     Minutes of Exercise per Session: 30 min       FAMILY HISTORY    Family History   Problem Relation Name Age of Onset    Hypertension Mother      Hypertension Father         REVIEW OF SYSTEMS    Review of Systems   Musculoskeletal:  Positive for neck pain.     All other systems reviewed and are negative    VITAL SIGNS:   BP (!) 155/97   Pulse 88   Temp 98.3 °F (36.8 °C) (Oral)   Resp 20   Ht 6' 2" (1.88 m)   Wt 81.6 kg (180 lb)   SpO2 96%   BMI 23.11 kg/m²      Physical Exam  Constitutional:       Appearance: Normal appearance.   HENT:      Head: Normocephalic.   Cardiovascular:      Rate and Rhythm: Normal rate.   Pulmonary:      Effort: Pulmonary effort is normal. No respiratory distress.      Breath sounds: Normal breath sounds.   Abdominal:      Palpations: Abdomen is soft.   Musculoskeletal:         General: Normal range of motion.      Cervical back: Pain with movement and muscular tenderness present.   Skin:     General: Skin is warm.      Capillary Refill: Capillary refill takes less than 2 seconds.   Neurological:      General: No focal deficit present.      Mental Status: He is alert.      GCS: GCS eye subscore is 4. GCS verbal subscore is 5. GCS motor subscore is 6.   Psychiatric:         Attention and Perception: Attention normal.         Mood and Affect: Mood normal.         Speech: Speech normal.       Vitals and nursing note reviewed.     LABS    Labs Reviewed - No data to display      EKG    No results found for this or any previous visit.      RADIOLOGY    X-Ray Cervical Spine AP And Lateral   Final Result      As above         Electronically signed by: Michelle Huggins   Date:    02/02/2025   Time:    19:35            PROCEDURES  "   Procedures    Medications - No data to display             Medical Decision Making  Ayan Marsh is a 49 y.o. male with PMH cervical surgery in 1996 who presents to ER for evaluation of neck pain, intermittent numbness on arms since he used tiller with big vibration. He wanted to make sure his surgical site did not get affected.  No other specific aggravating or relieving factors otherwise.    Ddx: Fracture, strain, sprain, tendonitis   Based on previous history, severe vibration movement on his neck, acute on chronic pain, cervical radiograph imaging ordered  No acute findings.   Clinical impression: cervical radiculopathy, no hardware failure on previous C4-C6 ACDF  Patient discharged to home with supportive care    Problems Addressed:  Acute neck pain: acute illness or injury  Cervical radiculopathy: chronic illness or injury with exacerbation, progression, or side effects of treatment  History of neck surgery: chronic illness or injury    Amount and/or Complexity of Data Reviewed  Radiology: ordered. Decision-making details documented in ED Course.     Details: No hardware failure noted     Intact C4-C6 ACDF.  Moderate C6-7 spondylosis.  No acute fracture.  Straightened cervical lordosis.         Risk  OTC drugs.           Discharge Medication List as of 2/2/2025  7:02 PM          Discharge Medication List as of 2/2/2025  7:02 PM          I discussed with patient and/or family/caretaker that evaluation in the ED does not suggest any emergent or life threatening medical condition requiring immediate intervention beyond what was provided in the ED, and I believe the patient is safe for discharge.  Regardless, an unremarkable evaluation in the ED does not preclude the development or presence of a serious or life threatening condition. As such, patient was instructed to return immediately for any worsening or change in current symptoms  Patient agrees with plan of care.    DISPOSITION  Patient discharged to  home in stable condition.        FINAL IMPRESSION    1. Cervical radiculopathy    2. Acute neck pain    3. History of neck surgery         Luis Alva NP  02/02/25 2000

## 2025-04-03 ENCOUNTER — OFFICE VISIT (OUTPATIENT)
Dept: FAMILY MEDICINE | Facility: CLINIC | Age: 50
End: 2025-04-03
Payer: MEDICARE

## 2025-04-03 VITALS
DIASTOLIC BLOOD PRESSURE: 80 MMHG | BODY MASS INDEX: 22.72 KG/M2 | RESPIRATION RATE: 12 BRPM | HEIGHT: 74 IN | WEIGHT: 177 LBS | HEART RATE: 92 BPM | SYSTOLIC BLOOD PRESSURE: 126 MMHG | OXYGEN SATURATION: 95 %

## 2025-04-03 DIAGNOSIS — Z00.00 PREVENTATIVE HEALTH CARE: ICD-10-CM

## 2025-04-03 DIAGNOSIS — R73.9 ELEVATED BLOOD SUGAR: ICD-10-CM

## 2025-04-03 DIAGNOSIS — K21.9 GASTROESOPHAGEAL REFLUX DISEASE WITHOUT ESOPHAGITIS: ICD-10-CM

## 2025-04-03 DIAGNOSIS — I10 ESSENTIAL HYPERTENSION, BENIGN: ICD-10-CM

## 2025-04-03 DIAGNOSIS — E78.2 MIXED HYPERLIPIDEMIA: ICD-10-CM

## 2025-04-03 DIAGNOSIS — N40.0 BENIGN PROSTATIC HYPERPLASIA, UNSPECIFIED WHETHER LOWER URINARY TRACT SYMPTOMS PRESENT: Primary | ICD-10-CM

## 2025-04-03 DIAGNOSIS — Z89.511 HX OF RIGHT BKA: ICD-10-CM

## 2025-04-03 PROCEDURE — 82570 ASSAY OF URINE CREATININE: CPT | Performed by: INTERNAL MEDICINE

## 2025-04-03 NOTE — PROGRESS NOTES
Subjective:       Patient ID: Ayan Marsh is a 49 y.o. male.    Chief Complaint: Follow-up and Hypertension (3 month)      History of Present Illness    CHIEF COMPLAINT:  Ayan presents for a follow-up visit to discuss recent labs results and medication management, particularly related to blood pressure and potassium levels.    HPI:  Ayan has a history of hypertension managed with medication. He consistently takes his blood pressure medicine every morning, storing it in his truck's console for convenience. He uses a blood pressure cuff at home to monitor his levels, keeping it readily accessible as a reminder to check regularly.    Ayan was hospitalized approximately 3 months ago due to low potassium levels. His last labs on January 13th showed very low potassium. Since then, he reports taking better care of himself.    He reports feeling better overall, with improvements noted in his appearance, including better color and looking well-rested.    MEDICATIONS:  Ayan is on blood pressure medication, which he takes every morning and stores in his truck console. He is also on Prilosec, with a refill available until July.    MEDICAL HISTORY:  Ayan has a history of hypertension and liver issues. On January 13th, he experienced low potassium levels that required hospitalization.    FAMILY HISTORY:  Family history is significant for mother experiencing dizziness. Her current health is declining.    SURGICAL HISTORY:  Ayan underwent a right leg amputation, though the date is not specified. He now has an artificial leg.    TEST RESULTS:  Ayan's potassium level was very low on January 13th. In March or April, his labs showed slightly higher results, though he was not fasting. During the same period, his liver enzymes were elevated, also while not fasting.    SOCIAL HISTORY:  Alcohol: Drinks in the evenings, amount reduced from previous levels Occupation: Employed         Review of Systems    Constitutional:  Negative for activity change, appetite change and fever.   Respiratory: Negative.     Cardiovascular: Negative.    Gastrointestinal: Negative.    Musculoskeletal: Negative.  Positive for leg pain.        Sec to R leg prosthesis         Objective:      Physical Exam  Vitals and nursing note reviewed.   Constitutional:       Appearance: Normal appearance.   Cardiovascular:      Rate and Rhythm: Normal rate and regular rhythm.      Pulses: Normal pulses.      Heart sounds: Normal heart sounds. No murmur heard.     No friction rub. No gallop.   Pulmonary:      Effort: Pulmonary effort is normal.      Breath sounds: Normal breath sounds. No wheezing.   Musculoskeletal:      Comments: S/p R BKA with leg prosthesis   Skin:     General: Skin is warm and dry.   Neurological:      Mental Status: He is alert.   Psychiatric:         Mood and Affect: Mood normal.         Assessment:       1. Benign prostatic hyperplasia, unspecified whether lower urinary tract symptoms present  -     Prostate Specific Antigen, Diagnostic; Future; Expected date: 04/03/2025    2. Mixed hyperlipidemia  -     Lipid Panel; Future; Expected date: 04/03/2025    3. Essential hypertension, benign  Overview:  Improved    Assessment & Plan:  Get routine labs  Dec ETOH consumption    Orders:  -     Comprehensive Metabolic Panel; Future; Expected date: 04/03/2025  -     Microalbumin/Creatinine Ratio, Urine; Future; Expected date: 04/03/2025    4. Elevated blood sugar  -     Hemoglobin A1C; Future; Expected date: 04/03/2025    5. Hx of right BKA  Overview:  Patient has no comorbidities that will keep him from using the prosthesis  Patient uses the prosthesis daily  Patient is a K3 functional level  Current prosthesis is unsafe due to cracks in the socket and carbon fiber foot is splintering.  Mr. Marsh is a very active individual who must work on his knees, squat, carry items when walking and walks on uneven terrain regularly.  He cares  for himself and performs household chores and grocery shopping.  In you prosthesis we will allow him to perform his normal activities safely and securely    Assessment & Plan:  F/u with Prosthesis clinic  Stable      6. Gastroesophageal reflux disease without esophagitis  Overview:  Stable    Assessment & Plan:  Prilosec prn      7. Preventative health care  Overview:  See below    Assessment & Plan:  Get new COVID , flu vaccines  Dec ETOH use             Plan:       Assessment & Plan    I10 Essential (primary) hypertension  E87.6 Hypokalemia  F10.10 Alcohol abuse, uncomplicated  Z89.511 Acquired absence of right leg below knee  Z97.10 Presence of artificial limb (complete) (partial), unspecified    IMPRESSION:  - Reviewed BP management, noting improvement with medication adherence and home monitoring.  - Assessed alcohol consumption, noting reduction in intake and improved overall health appearance.  - Evaluated need for prosthetic socket prescription, deferring to [facility] for specifics.    ESSENTIAL HYPERTENSION:  - Instructed the patient to continue using home BP monitoring device.  - Continued the patient's current BP medication regimen, to be taken daily in the morning.  - Confirmed that the patient's blood pressure is very good.  - Acknowledged the importance of regular blood pressure monitoring and medication adherence.    HYPOKALEMIA:  - Ordered comprehensive labs including CBC, CMP, PSA, lipid panel, urinalysis, and renal function tests.  - Ordered comprehensive labs including CBC, CMP, and renal function tests to check potassium levels.  - Noted that the patient was hospitalized 3 months ago for low potassium levels.  - Reviewed last labs from January, which showed very low potassium levels.  - Planned to repeat labs to reassess potassium levels.    ALCOHOL ABUSE:  - Advised the patient to maintain reduced alcohol consumption, limiting intake to evenings only.  - Noted that the patient reports drinking less  "alcohol and no longer consuming alcohol throughout the day.  - Observed improved physical appearance and overall health of the patient.  - Inquired about the patient's current alcohol consumption habits.  - Ayan to maintain reduced alcohol consumption, limiting to evenings only.    PROSTHETIC RIGHT LEG:  - Confirmed with the patient that the right leg is the prosthetic limb.  - Prescribed a new socket for the patient's prosthetic leg.  - Arranged for [facility] (likely a prosthetist) to handle the new socket prescription.  - Noted the presence of an artificial right leg below the knee.  - Prescribed a new socket for the patient's prosthetic leg.  - Arranged for [facility] (likely a prosthetist) to handle the new socket prescription.    FOLLOW-UP:  - Explained importance of flu vaccination, especially given recent significant case seen in another patient.       Ayan Schneider" was seen today for follow-up and hypertension.    Diagnoses and all orders for this visit:    Benign prostatic hyperplasia, unspecified whether lower urinary tract symptoms present  -     Prostate Specific Antigen, Diagnostic; Future    Mixed hyperlipidemia  -     Lipid Panel; Future    Essential hypertension, benign  -     Comprehensive Metabolic Panel; Future  -     Microalbumin/Creatinine Ratio, Urine; Future    Elevated blood sugar  -     Hemoglobin A1C; Future    Hx of right BKA    Gastroesophageal reflux disease without esophagitis    Preventative health care            Visit today included increased complexity associated with the care of the episodic problem.   I addressed and managing the longitudinal care of the patient due to the serious and/or complex managed problem(s).  .        "

## 2025-08-06 ENCOUNTER — OFFICE VISIT (OUTPATIENT)
Dept: FAMILY MEDICINE | Facility: CLINIC | Age: 50
End: 2025-08-06
Payer: MEDICARE

## 2025-08-06 VITALS
HEART RATE: 97 BPM | BODY MASS INDEX: 22.11 KG/M2 | DIASTOLIC BLOOD PRESSURE: 80 MMHG | OXYGEN SATURATION: 99 % | SYSTOLIC BLOOD PRESSURE: 118 MMHG | HEIGHT: 74 IN | WEIGHT: 172.31 LBS

## 2025-08-06 DIAGNOSIS — I10 ESSENTIAL HYPERTENSION, BENIGN: ICD-10-CM

## 2025-08-06 DIAGNOSIS — G54.6 PHANTOM PAIN AFTER AMPUTATION OF LOWER EXTREMITY: Primary | ICD-10-CM

## 2025-08-06 DIAGNOSIS — Z89.511 HX OF RIGHT BKA: ICD-10-CM

## 2025-08-06 DIAGNOSIS — Z72.0 TOBACCO USE: ICD-10-CM

## 2025-08-06 PROBLEM — M70.21 OLECRANON BURSITIS OF RIGHT ELBOW: Status: RESOLVED | Noted: 2022-09-23 | Resolved: 2025-08-06

## 2025-08-06 PROBLEM — E72.20 SERUM AMMONIA INCREASED: Status: RESOLVED | Noted: 2024-11-11 | Resolved: 2025-08-06

## 2025-08-06 PROBLEM — T87.89 STUMP PAIN: Status: RESOLVED | Noted: 2024-11-11 | Resolved: 2025-08-06

## 2025-08-06 PROBLEM — F10.982 ALCOHOL-INDUCED INSOMNIA: Status: RESOLVED | Noted: 2023-11-20 | Resolved: 2025-08-06

## 2025-08-06 PROBLEM — Z00.00 PREVENTATIVE HEALTH CARE: Status: RESOLVED | Noted: 2022-09-23 | Resolved: 2025-08-06

## 2025-08-06 PROBLEM — M70.21 OLECRANON BURSITIS, RIGHT ELBOW: Status: RESOLVED | Noted: 2020-02-19 | Resolved: 2025-08-06

## 2025-08-06 PROBLEM — F17.210 TOBACCO DEPENDENCE DUE TO CIGARETTES: Status: RESOLVED | Noted: 2022-09-23 | Resolved: 2025-08-06

## 2025-08-06 PROBLEM — T40.2X1A: Status: RESOLVED | Noted: 2024-11-11 | Resolved: 2025-08-06

## 2025-08-06 PROBLEM — L03.115 CELLULITIS OF RIGHT LEG WITHOUT FOOT: Status: RESOLVED | Noted: 2022-09-23 | Resolved: 2025-08-06

## 2025-08-06 PROBLEM — M79.609 STUMP PAIN: Status: RESOLVED | Noted: 2024-11-11 | Resolved: 2025-08-06

## 2025-08-06 RX ORDER — BUPROPION HYDROCHLORIDE 150 MG/1
150 TABLET, EXTENDED RELEASE ORAL 2 TIMES DAILY
Qty: 60 TABLET | Refills: 11 | Status: SHIPPED | OUTPATIENT
Start: 2025-08-06 | End: 2026-08-06

## 2025-08-06 RX ORDER — GABAPENTIN 800 MG/1
800 TABLET ORAL 3 TIMES DAILY
Qty: 270 TABLET | Refills: 3 | Status: SHIPPED | OUTPATIENT
Start: 2025-08-06 | End: 2026-02-02

## 2025-08-06 RX ORDER — OMEPRAZOLE 40 MG/1
40 CAPSULE, DELAYED RELEASE ORAL
Qty: 90 CAPSULE | Refills: 3 | Status: SHIPPED | OUTPATIENT
Start: 2025-08-06 | End: 2026-02-02

## 2025-08-06 RX ORDER — AMLODIPINE BESYLATE 10 MG/1
10 TABLET ORAL DAILY
Qty: 90 TABLET | Refills: 3 | Status: SHIPPED | OUTPATIENT
Start: 2025-08-06 | End: 2026-02-02

## 2025-08-06 NOTE — PROGRESS NOTES
"    [facility] - [place], [place]    Family Medicine Office Visit    Chief Complaint   Patient presents with    Establish Care    Medication Refill        HPI:  establish care to maintain health and chronic conditions    Blood Pressure at goal on medication, with patient reporting prescription compliance  Some report of past etoh related liver issues - has had notable liver dysfunction and electrolyte abnormalities with this    Smoker, wants to quit    ROS: as above    Vitals:    08/06/25 1404   BP: 118/80   BP Location: Right arm   Patient Position: Sitting   Pulse: 97   SpO2: 99%   Weight: 78.1 kg (172 lb 4.6 oz)   Height: 6' 2" (1.88 m)      Body mass index is 22.12 kg/m².      General:  AOx3, well nourished and developed in no acute distress  Eyes:  PERRLA, EOMI, vision intact grossly  ENT:  normal hearing, moist oral mucosa  Neck:  trachea midline with no masses or thyromegaly  Heart:  RRR, no murmurs.  No edema noted, extremities warm and well perfused  Lungs:  clear to auscultation bilaterally with symmetric chest movement  Abdomen:  Soft, nontender, nondistended.  Normal bowel sounds  Musculoskeletal:  Normal gait.  Normal posture.  Normal muscular development with no joint swelling.  Neurological:  CN II-XII grossly intact. Symmetric strength and sensation  Psych:  Normal mood and affect.  Able to demonstrate good judgement and personal insight.      Assessment/Plan:    1. Phantom pain after amputation of lower extremity  Overview:  On R side      2. Essential hypertension, benign  Overview:  Improved      3. Hx of right BKA    4. Tobacco use  Overview:  Patient down to about half a pack a day         Continue gabapentin  At goal  Stable as above  Start wellbutrin, will help with etoh cessation as well.  Spent >5 minutes counseling patient on importance of smoking cessation through use of social networks, nicotine replacement therapy, and use of prescription medication if " desire.    Visit today included increased complexity associated with the care of the episodic problem htn, bka, smoker addressed and managing the longitudinal care of the patient due to the serious and/or complex managed problem(s) htn, bka, smoker.